# Patient Record
Sex: FEMALE | Race: WHITE | Employment: FULL TIME | URBAN - METROPOLITAN AREA
[De-identification: names, ages, dates, MRNs, and addresses within clinical notes are randomized per-mention and may not be internally consistent; named-entity substitution may affect disease eponyms.]

---

## 2017-09-07 ENCOUNTER — APPOINTMENT (OUTPATIENT)
Dept: LAB | Age: 32
End: 2017-09-07
Attending: FAMILY MEDICINE
Payer: COMMERCIAL

## 2017-09-07 ENCOUNTER — OFFICE VISIT (OUTPATIENT)
Dept: FAMILY MEDICINE CLINIC | Facility: CLINIC | Age: 32
End: 2017-09-07

## 2017-09-07 VITALS
WEIGHT: 170 LBS | RESPIRATION RATE: 16 BRPM | BODY MASS INDEX: 30.12 KG/M2 | HEIGHT: 63 IN | SYSTOLIC BLOOD PRESSURE: 104 MMHG | HEART RATE: 80 BPM | DIASTOLIC BLOOD PRESSURE: 62 MMHG | TEMPERATURE: 98 F | OXYGEN SATURATION: 99 %

## 2017-09-07 DIAGNOSIS — Z13.0 SCREENING FOR ENDOCRINE, NUTRITIONAL, METABOLIC AND IMMUNITY DISORDER: ICD-10-CM

## 2017-09-07 DIAGNOSIS — Z00.00 ANNUAL PHYSICAL EXAM: Primary | ICD-10-CM

## 2017-09-07 DIAGNOSIS — F41.9 ANXIETY AND DEPRESSION: ICD-10-CM

## 2017-09-07 DIAGNOSIS — Z13.29 SCREENING FOR ENDOCRINE, NUTRITIONAL, METABOLIC AND IMMUNITY DISORDER: ICD-10-CM

## 2017-09-07 DIAGNOSIS — Z13.228 SCREENING FOR ENDOCRINE, NUTRITIONAL, METABOLIC AND IMMUNITY DISORDER: ICD-10-CM

## 2017-09-07 DIAGNOSIS — Z13.21 SCREENING FOR ENDOCRINE, NUTRITIONAL, METABOLIC AND IMMUNITY DISORDER: ICD-10-CM

## 2017-09-07 DIAGNOSIS — F32.A ANXIETY AND DEPRESSION: ICD-10-CM

## 2017-09-07 LAB
25-HYDROXYVITAMIN D (TOTAL): 35.4 NG/ML (ref 30–100)
ALBUMIN SERPL-MCNC: 3.8 G/DL (ref 3.5–4.8)
ALP LIVER SERPL-CCNC: 40 U/L (ref 37–98)
ALT SERPL-CCNC: 19 U/L (ref 14–54)
AST SERPL-CCNC: 9 U/L (ref 15–41)
BILIRUB SERPL-MCNC: 0.4 MG/DL (ref 0.1–2)
BUN BLD-MCNC: 11 MG/DL (ref 8–20)
CALCIUM BLD-MCNC: 8.9 MG/DL (ref 8.3–10.3)
CHLORIDE: 109 MMOL/L (ref 101–111)
CHOLEST SMN-MCNC: 134 MG/DL (ref ?–200)
CO2: 26 MMOL/L (ref 22–32)
CREAT BLD-MCNC: 0.65 MG/DL (ref 0.55–1.02)
ERYTHROCYTE [DISTWIDTH] IN BLOOD BY AUTOMATED COUNT: 12.4 % (ref 11.5–16)
GLUCOSE BLD-MCNC: 74 MG/DL (ref 70–99)
HAV AB SERPL IA-ACNC: 703 PG/ML (ref 193–986)
HCT VFR BLD AUTO: 38.6 % (ref 34–50)
HDLC SERPL-MCNC: 71 MG/DL (ref 45–?)
HDLC SERPL: 1.89 {RATIO} (ref ?–4.44)
HGB BLD-MCNC: 12.6 G/DL (ref 12–16)
LDLC SERPL CALC-MCNC: 57 MG/DL (ref ?–130)
LDLC SERPL-MCNC: 6 MG/DL (ref 5–40)
M PROTEIN MFR SERPL ELPH: 7.1 G/DL (ref 6.1–8.3)
MCH RBC QN AUTO: 31.3 PG (ref 27–33.2)
MCHC RBC AUTO-ENTMCNC: 32.6 G/DL (ref 31–37)
MCV RBC AUTO: 95.8 FL (ref 81–100)
NONHDLC SERPL-MCNC: 63 MG/DL (ref ?–130)
PLATELET # BLD AUTO: 226 10(3)UL (ref 150–450)
POTASSIUM SERPL-SCNC: 4.2 MMOL/L (ref 3.6–5.1)
RBC # BLD AUTO: 4.03 X10(6)UL (ref 3.8–5.1)
RED CELL DISTRIBUTION WIDTH-SD: 43.5 FL (ref 35.1–46.3)
SODIUM SERPL-SCNC: 141 MMOL/L (ref 136–144)
TRIGLYCERIDES: 32 MG/DL (ref ?–150)
TSI SER-ACNC: 0.94 MIU/ML (ref 0.35–5.5)
WBC # BLD AUTO: 5.1 X10(3) UL (ref 4–13)

## 2017-09-07 PROCEDURE — 80050 GENERAL HEALTH PANEL: CPT | Performed by: FAMILY MEDICINE

## 2017-09-07 PROCEDURE — 82306 VITAMIN D 25 HYDROXY: CPT | Performed by: FAMILY MEDICINE

## 2017-09-07 PROCEDURE — 80061 LIPID PANEL: CPT | Performed by: FAMILY MEDICINE

## 2017-09-07 PROCEDURE — 36415 COLL VENOUS BLD VENIPUNCTURE: CPT | Performed by: FAMILY MEDICINE

## 2017-09-07 PROCEDURE — 82607 VITAMIN B-12: CPT | Performed by: FAMILY MEDICINE

## 2017-09-07 PROCEDURE — 99395 PREV VISIT EST AGE 18-39: CPT | Performed by: FAMILY MEDICINE

## 2017-09-07 RX ORDER — CLONAZEPAM 0.5 MG/1
0.5 TABLET ORAL 2 TIMES DAILY PRN
Qty: 30 TABLET | Refills: 0 | Status: SHIPPED
Start: 2017-09-07 | End: 2019-09-09

## 2017-09-07 NOTE — PROGRESS NOTES
Rock Quiroz is a 33year old female.     CC:  Patient presents with:  Physical: X 2 weeks feeling tired, stress is high, weight gain      HPI:  Annual Depression Screen due on 08/07/1986  Annual Physical due on 08/07/1988  Pap Smear,3 Years due on 08/0 Immunization History  Administered            Date(s) Administered    TDAP                  10/29/2010          Wt Readings from Last 6 Encounters:  09/07/17 : 170 lb  12/12/16 : 165 lb  12/01/16 : 165 lb  05/10/16 : 165 lb  10/07/14 : 160 lb  03 without murmur  GI: good BS's,no masses, HSM or tenderness  MUSCULOSKELETAL: back is not tender,FROM of the back  EXTREMITIES: no cyanosis, clubbing or edema  SKIN: no rashes,no suspicious lesions  NEURO: Oriented times three, cranial nerves are intact, mo

## 2017-09-12 ENCOUNTER — TELEPHONE (OUTPATIENT)
Dept: FAMILY MEDICINE CLINIC | Facility: CLINIC | Age: 32
End: 2017-09-12

## 2017-09-12 NOTE — TELEPHONE ENCOUNTER
----- Message from Chrissie Weems MD sent at 9/10/2017  8:06 AM CDT -----  Results reviewed. Tests show no significant abnormalities. Please inform patient.

## 2018-10-24 ENCOUNTER — OFFICE VISIT (OUTPATIENT)
Dept: FAMILY MEDICINE CLINIC | Facility: CLINIC | Age: 33
End: 2018-10-24
Payer: COMMERCIAL

## 2018-10-24 VITALS
SYSTOLIC BLOOD PRESSURE: 112 MMHG | RESPIRATION RATE: 16 BRPM | HEART RATE: 82 BPM | BODY MASS INDEX: 31.89 KG/M2 | TEMPERATURE: 99 F | HEIGHT: 63 IN | DIASTOLIC BLOOD PRESSURE: 74 MMHG | OXYGEN SATURATION: 98 % | WEIGHT: 180 LBS

## 2018-10-24 DIAGNOSIS — J20.9 ACUTE BRONCHITIS WITH SYMPTOMS > 10 DAYS: Primary | ICD-10-CM

## 2018-10-24 PROCEDURE — 99213 OFFICE O/P EST LOW 20 MIN: CPT | Performed by: NURSE PRACTITIONER

## 2018-10-24 RX ORDER — AZITHROMYCIN 250 MG/1
TABLET, FILM COATED ORAL
Qty: 6 TABLET | Refills: 0 | Status: SHIPPED | OUTPATIENT
Start: 2018-10-24 | End: 2019-07-02 | Stop reason: ALTCHOICE

## 2018-10-24 RX ORDER — BENZONATATE 200 MG/1
200 CAPSULE ORAL 3 TIMES DAILY PRN
Qty: 20 CAPSULE | Refills: 0 | Status: SHIPPED | OUTPATIENT
Start: 2018-10-24 | End: 2018-10-31

## 2018-10-24 NOTE — PROGRESS NOTES
CHIEF COMPLAINT:   Patient presents with:  Cough: x 2.5 weeks, coughing attacks       HPI:   Bhavna Ly is a 29year old female who presents for upper respiratory symptoms for  2.5 weeks.  Patient reports cough with white colored sputum, wheezing, denie GENERAL: well developed, well nourished, and in no apparent distress  SKIN: no rashes, no suspicious lesions  HEAD: atraumatic, normocephalic.  no tenderness on palpation of maxillary or frontal sinuses.   EYES: conjunctiva clear, EOM intact  EARS: TM's jules Symptoms of bronchitis include cough with mucus (phlegm) and low-grade fever. Bronchitis usually lasts 7 to 14 days. Mild cases can be treated with simple home remedies. More severe infection is treated with an antibiotic.   Home care  Follow these guidelin If you are age 72 or older, or if you have a chronic lung disease or condition that affects your immune system, or you smoke, ask your healthcare provider about getting a pneumococcal vaccine and a yearly flu shot (influenza vaccine).   When to seek medical

## 2019-04-10 PROCEDURE — 87624 HPV HI-RISK TYP POOLED RSLT: CPT | Performed by: NURSE PRACTITIONER

## 2019-04-10 PROCEDURE — 87591 N.GONORRHOEAE DNA AMP PROB: CPT | Performed by: NURSE PRACTITIONER

## 2019-04-10 PROCEDURE — 87491 CHLMYD TRACH DNA AMP PROBE: CPT | Performed by: NURSE PRACTITIONER

## 2019-04-10 PROCEDURE — 88175 CYTOPATH C/V AUTO FLUID REDO: CPT | Performed by: NURSE PRACTITIONER

## 2019-07-02 ENCOUNTER — OFFICE VISIT (OUTPATIENT)
Dept: FAMILY MEDICINE CLINIC | Facility: CLINIC | Age: 34
End: 2019-07-02
Payer: COMMERCIAL

## 2019-07-02 VITALS
HEIGHT: 63 IN | TEMPERATURE: 98 F | WEIGHT: 165 LBS | BODY MASS INDEX: 29.23 KG/M2 | RESPIRATION RATE: 16 BRPM | HEART RATE: 79 BPM | SYSTOLIC BLOOD PRESSURE: 92 MMHG | OXYGEN SATURATION: 98 % | DIASTOLIC BLOOD PRESSURE: 52 MMHG

## 2019-07-02 DIAGNOSIS — L03.116 CELLULITIS OF LEFT LOWER EXTREMITY: Primary | ICD-10-CM

## 2019-07-02 PROCEDURE — 99213 OFFICE O/P EST LOW 20 MIN: CPT | Performed by: NURSE PRACTITIONER

## 2019-07-02 RX ORDER — DOXYCYCLINE HYCLATE 100 MG/1
100 CAPSULE ORAL 2 TIMES DAILY
Qty: 14 CAPSULE | Refills: 0 | Status: SHIPPED | OUTPATIENT
Start: 2019-07-02 | End: 2019-07-09

## 2019-07-02 RX ORDER — METHYLPREDNISOLONE 4 MG/1
TABLET ORAL
Qty: 1 KIT | Refills: 0 | Status: SHIPPED | OUTPATIENT
Start: 2019-07-02 | End: 2019-09-09 | Stop reason: ALTCHOICE

## 2019-07-02 NOTE — PATIENT INSTRUCTIONS
Cellulitis  Cellulitis is an infection of the deep layers of skin. A break in the skin, such as a cut or scratch, can let bacteria under the skin. If the bacteria get to deep layers of the skin, it can be serious.  If not treated, cellulitis can get into · Fever higher of 100.4º F (38.0º C) or higher after 2 days on antibiotics  Date Last Reviewed: 9/1/2016  © 6486-6667 [employer]. [address] [facility], [address] [place] [address]. All rights reserved.  This information is not intended as a substitut

## 2019-07-02 NOTE — PROGRESS NOTES
HPI:    Patient ID: Pito Canales is a 29year old female. Insect Bite   This is a new problem. The current episode started yesterday. The problem has been gradually worsening since onset. Location: left outer thigh/hip.  The rash is characterized by re Cellulitis of left lower extremity  (primary encounter diagnosis)    No orders of the defined types were placed in this encounter.       Meds This Visit:  Requested Prescriptions     Signed Prescriptions Disp Refills   • Doxycycline Hyclate 100 MG Oral Cap · Take all of the antibiotic medicine exactly as directed until it is gone. Do not miss any doses, especially during the first 7 days. Don’t stop taking the medicine when your symptoms get better. · Keep the affected area clean and dry.   · Wash your hands

## 2019-09-09 ENCOUNTER — LAB ENCOUNTER (OUTPATIENT)
Dept: LAB | Age: 34
End: 2019-09-09
Attending: FAMILY MEDICINE
Payer: COMMERCIAL

## 2019-09-09 ENCOUNTER — OFFICE VISIT (OUTPATIENT)
Dept: FAMILY MEDICINE CLINIC | Facility: CLINIC | Age: 34
End: 2019-09-09
Payer: COMMERCIAL

## 2019-09-09 VITALS
SYSTOLIC BLOOD PRESSURE: 110 MMHG | HEART RATE: 80 BPM | TEMPERATURE: 98 F | RESPIRATION RATE: 16 BRPM | OXYGEN SATURATION: 98 % | BODY MASS INDEX: 28.7 KG/M2 | DIASTOLIC BLOOD PRESSURE: 64 MMHG | WEIGHT: 162 LBS | HEIGHT: 63 IN

## 2019-09-09 DIAGNOSIS — R21 TARGET RASH: ICD-10-CM

## 2019-09-09 DIAGNOSIS — F33.8 SEASONAL AFFECTIVE DISORDER (HCC): ICD-10-CM

## 2019-09-09 DIAGNOSIS — Z00.00 ROUTINE PHYSICAL EXAMINATION: Primary | ICD-10-CM

## 2019-09-09 DIAGNOSIS — F32.81 PMDD (PREMENSTRUAL DYSPHORIC DISORDER): ICD-10-CM

## 2019-09-09 DIAGNOSIS — Z01.419 WOMEN'S ANNUAL ROUTINE GYNECOLOGICAL EXAMINATION: ICD-10-CM

## 2019-09-09 DIAGNOSIS — N92.6 IRREGULAR MENSES: ICD-10-CM

## 2019-09-09 DIAGNOSIS — F41.8 OTHER SPECIFIED ANXIETY DISORDERS: ICD-10-CM

## 2019-09-09 DIAGNOSIS — Z00.00 LABORATORY EXAMINATION ORDERED AS PART OF A ROUTINE GENERAL MEDICAL EXAMINATION: ICD-10-CM

## 2019-09-09 DIAGNOSIS — G43.109 MIGRAINE WITH AURA AND WITHOUT STATUS MIGRAINOSUS, NOT INTRACTABLE: ICD-10-CM

## 2019-09-09 LAB
ALBUMIN SERPL-MCNC: 3.9 G/DL (ref 3.4–5)
ALBUMIN/GLOB SERPL: 1.1 {RATIO} (ref 1–2)
ALP LIVER SERPL-CCNC: 47 U/L (ref 37–98)
ALT SERPL-CCNC: 15 U/L (ref 13–56)
ANION GAP SERPL CALC-SCNC: 5 MMOL/L (ref 0–18)
AST SERPL-CCNC: 14 U/L (ref 15–37)
BASOPHILS # BLD AUTO: 0.02 X10(3) UL (ref 0–0.2)
BASOPHILS NFR BLD AUTO: 0.3 %
BILIRUB SERPL-MCNC: 0.6 MG/DL (ref 0.1–2)
BUN BLD-MCNC: 10 MG/DL (ref 7–18)
BUN/CREAT SERPL: 13.7 (ref 10–20)
CALCIUM BLD-MCNC: 9.2 MG/DL (ref 8.5–10.1)
CHLORIDE SERPL-SCNC: 106 MMOL/L (ref 98–112)
CHOLEST SMN-MCNC: 153 MG/DL (ref ?–200)
CO2 SERPL-SCNC: 29 MMOL/L (ref 21–32)
CREAT BLD-MCNC: 0.73 MG/DL (ref 0.55–1.02)
DEPRECATED HBV CORE AB SER IA-ACNC: 31.6 NG/ML (ref 12–160)
DEPRECATED RDW RBC AUTO: 43.3 FL (ref 35.1–46.3)
EOSINOPHIL # BLD AUTO: 0.05 X10(3) UL (ref 0–0.7)
EOSINOPHIL NFR BLD AUTO: 0.7 %
ERYTHROCYTE [DISTWIDTH] IN BLOOD BY AUTOMATED COUNT: 12.3 % (ref 11–15)
GLOBULIN PLAS-MCNC: 3.5 G/DL (ref 2.8–4.4)
GLUCOSE BLD-MCNC: 87 MG/DL (ref 70–99)
HCT VFR BLD AUTO: 39.4 % (ref 35–48)
HDLC SERPL-MCNC: 86 MG/DL (ref 40–59)
HGB BLD-MCNC: 12.9 G/DL (ref 12–16)
IMM GRANULOCYTES # BLD AUTO: 0.02 X10(3) UL (ref 0–1)
IMM GRANULOCYTES NFR BLD: 0.3 %
IRON SATURATION: 33 % (ref 15–50)
IRON SERPL-MCNC: 146 UG/DL (ref 50–170)
LDLC SERPL CALC-MCNC: 62 MG/DL (ref ?–100)
LYMPHOCYTES # BLD AUTO: 1.5 X10(3) UL (ref 1–4)
LYMPHOCYTES NFR BLD AUTO: 21.3 %
M PROTEIN MFR SERPL ELPH: 7.4 G/DL (ref 6.4–8.2)
MCH RBC QN AUTO: 31.5 PG (ref 26–34)
MCHC RBC AUTO-ENTMCNC: 32.7 G/DL (ref 31–37)
MCV RBC AUTO: 96.1 FL (ref 80–100)
MONOCYTES # BLD AUTO: 0.53 X10(3) UL (ref 0.1–1)
MONOCYTES NFR BLD AUTO: 7.5 %
NEUTROPHILS # BLD AUTO: 4.91 X10 (3) UL (ref 1.5–7.7)
NEUTROPHILS # BLD AUTO: 4.91 X10(3) UL (ref 1.5–7.7)
NEUTROPHILS NFR BLD AUTO: 69.9 %
NONHDLC SERPL-MCNC: 67 MG/DL (ref ?–130)
OSMOLALITY SERPL CALC.SUM OF ELEC: 288 MOSM/KG (ref 275–295)
PLATELET # BLD AUTO: 264 10(3)UL (ref 150–450)
POTASSIUM SERPL-SCNC: 3.9 MMOL/L (ref 3.5–5.1)
RBC # BLD AUTO: 4.1 X10(6)UL (ref 3.8–5.3)
SODIUM SERPL-SCNC: 140 MMOL/L (ref 136–145)
TOTAL IRON BINDING CAPACITY: 441 UG/DL (ref 240–450)
TRANSFERRIN SERPL-MCNC: 296 MG/DL (ref 200–360)
TRIGL SERPL-MCNC: 27 MG/DL (ref 30–149)
TSI SER-ACNC: 0.69 MIU/ML (ref 0.36–3.74)
VIT D+METAB SERPL-MCNC: 44.8 NG/ML (ref 30–100)
VLDLC SERPL CALC-MCNC: 5 MG/DL (ref 0–30)
WBC # BLD AUTO: 7 X10(3) UL (ref 4–11)

## 2019-09-09 PROCEDURE — 80053 COMPREHEN METABOLIC PANEL: CPT | Performed by: FAMILY MEDICINE

## 2019-09-09 PROCEDURE — 99395 PREV VISIT EST AGE 18-39: CPT | Performed by: FAMILY MEDICINE

## 2019-09-09 PROCEDURE — 90715 TDAP VACCINE 7 YRS/> IM: CPT | Performed by: FAMILY MEDICINE

## 2019-09-09 PROCEDURE — 82728 ASSAY OF FERRITIN: CPT | Performed by: FAMILY MEDICINE

## 2019-09-09 PROCEDURE — 90471 IMMUNIZATION ADMIN: CPT | Performed by: FAMILY MEDICINE

## 2019-09-09 PROCEDURE — 83540 ASSAY OF IRON: CPT | Performed by: FAMILY MEDICINE

## 2019-09-09 PROCEDURE — 36415 COLL VENOUS BLD VENIPUNCTURE: CPT | Performed by: FAMILY MEDICINE

## 2019-09-09 PROCEDURE — 86618 LYME DISEASE ANTIBODY: CPT | Performed by: FAMILY MEDICINE

## 2019-09-09 PROCEDURE — 83550 IRON BINDING TEST: CPT | Performed by: FAMILY MEDICINE

## 2019-09-09 PROCEDURE — 82306 VITAMIN D 25 HYDROXY: CPT | Performed by: FAMILY MEDICINE

## 2019-09-09 RX ORDER — CLONAZEPAM 0.5 MG/1
0.5 TABLET ORAL 2 TIMES DAILY PRN
Qty: 30 TABLET | Refills: 0 | Status: SHIPPED | OUTPATIENT
Start: 2019-09-09 | End: 2020-03-11

## 2019-09-09 RX ORDER — SUMATRIPTAN 50 MG/1
TABLET, FILM COATED ORAL
Qty: 12 TABLET | Refills: 2 | Status: SHIPPED | OUTPATIENT
Start: 2019-09-09 | End: 2019-10-15 | Stop reason: ALTCHOICE

## 2019-09-09 NOTE — PROGRESS NOTES
Ozzy Friedman is a 36year old female. CC:  Patient presents with:   Well Adult: annual visit, no pap      HPI:  Annual Depression Screen due on 08/07/1998  Influenza Vaccine(1) due on 09/01/2019  Annual Physical due on 04/10/2020  Pap Smear,3 Years d Problem Relation Age of Onset   • Breast Cancer Paternal Grandmother 48   • Cancer Paternal Grandmother    • Heart Disease Paternal Grandmother    • Other (Other) Paternal Grandmother    • Other (HTN) Paternal Grandmother    • Breast Cancer Maternal [facility] [place] constipation, diarrhea  : no dysuria, urgency or frequency, no vaginal discharge  MUSCULOSKELETAL: no joint complaints upper or lower extremities  NEURO: +migraines as above, no N/T  HEMATOLOGY: denies bruising or excessive bleeding  ENDOCRINE: denies ex DISEASE, TOTAL AB W RFLX; Future    6. Other specified anxiety disorders  Clonazepam prn     7. Seasonal affective disorder ([facility]  - VITAMIN D, 25-HYDROXY; Future    8.  Migraine with aura and without status migrainosus, not intractable  imitrex prn     Rec

## 2019-09-09 NOTE — PATIENT INSTRUCTIONS
Monitor for migraine symptoms, follow-up with your OB doctor if you continue to get these migraines with a rainbow or unusual image. Estrogen can increase the risk of migraines in some women and this could also be a sign of an increased risk of stroke. can help prevent migraines and improve your health. (If exercise triggers your migraines, talk to your healthcare provider.)  · Keep regular habits. Don’t skip or delay meals. Drink plenty of water. And go to bed and get up at about the same time each day.

## 2019-09-11 LAB — B BURGDOR IGG+IGM SER QL: NEGATIVE

## 2019-10-15 ENCOUNTER — TELEPHONE (OUTPATIENT)
Dept: FAMILY MEDICINE CLINIC | Facility: CLINIC | Age: 34
End: 2019-10-15

## 2019-10-15 RX ORDER — RIZATRIPTAN BENZOATE 10 MG/1
10 TABLET ORAL AS NEEDED
Qty: 10 TABLET | Refills: 2 | Status: SHIPPED | OUTPATIENT
Start: 2019-10-15 | End: 2020-05-12

## 2019-10-15 NOTE — TELEPHONE ENCOUNTER
Patient was prescribed Immitrex but it is not working for her and was hoping that she could be switched to [facility].

## 2019-10-15 NOTE — TELEPHONE ENCOUNTER
LOV 9/9/19  I called to confirm pt is requesting Maxalt. Her mom has Maxalt and she has tried that before and it helped. She currently has a migraine for about 1 1/2 hrs and took an Imitrex with little relief.   Pain is generalized all over the head and

## 2020-03-11 RX ORDER — CLONAZEPAM 0.5 MG/1
TABLET ORAL
Qty: 30 TABLET | Refills: 0 | Status: SHIPPED | OUTPATIENT
Start: 2020-03-11 | End: 2020-04-13

## 2020-03-11 NOTE — TELEPHONE ENCOUNTER
Medication(s) to Refill:   Requested Prescriptions     Pending Prescriptions Disp Refills   • CLONAZEPAM 0.5 MG Oral Tab [Pharmacy Med Name: Clonazepam 0.5 Mg Tab Acco] 30 tablet 0     Sig: TAKE ONE TABLET BY MOUTH TWICE DAILY AS NEEDED FOR ANXIETY

## 2020-04-13 RX ORDER — CLONAZEPAM 0.5 MG/1
TABLET ORAL
Qty: 30 TABLET | Refills: 0 | Status: SHIPPED | OUTPATIENT
Start: 2020-04-13 | End: 2020-05-21

## 2020-04-13 NOTE — TELEPHONE ENCOUNTER
Medication(s) to Refill:   Requested Prescriptions     Pending Prescriptions Disp Refills   • CLONAZEPAM 0.5 MG Oral Tab [Pharmacy Med Name: Clonazepam 0.5 Mg Tab Teva] 30 tablet 0     Sig: TAKE ONE TABLET BY MOUTH TWICE DAILY AS NEEDED FOR ANXIETY

## 2020-04-22 NOTE — LETTER
Date: 1/10/2022    Patient Name: Yasmine Baker          To Whom it may concern: This letter has been written at the patient's request. The above patient was seen at the [facility] for treatment of a medical condition.     This patient shou
Patient has no objection to blood transfusions.

## 2020-05-08 RX ORDER — RIZATRIPTAN BENZOATE 10 MG/1
TABLET ORAL
Qty: 10 TABLET | Refills: 0 | OUTPATIENT
Start: 2020-05-08

## 2020-05-08 NOTE — TELEPHONE ENCOUNTER
ZZV:7/9/6152  LF:10/15/2019    LVM for patient to call the office. Patient needs medication follow up.

## 2020-05-12 RX ORDER — RIZATRIPTAN BENZOATE 10 MG/1
10 TABLET ORAL AS NEEDED
Qty: 10 TABLET | Refills: 2 | Status: SHIPPED | OUTPATIENT
Start: 2020-05-12 | End: 2020-09-09

## 2020-05-12 NOTE — TELEPHONE ENCOUNTER
LOV: 9/9/19  LF: 10/15/19  Patient scheduled an appointment for 5/14/20 but is needing a refill before then  Please approve or deny pending Rx. Thank you!

## 2020-05-14 ENCOUNTER — TELEMEDICINE (OUTPATIENT)
Dept: FAMILY MEDICINE CLINIC | Facility: CLINIC | Age: 35
End: 2020-05-14
Payer: COMMERCIAL

## 2020-05-14 DIAGNOSIS — F41.9 ANXIETY AND DEPRESSION: ICD-10-CM

## 2020-05-14 DIAGNOSIS — G43.109 MIGRAINE WITH AURA AND WITHOUT STATUS MIGRAINOSUS, NOT INTRACTABLE: Primary | ICD-10-CM

## 2020-05-14 DIAGNOSIS — F32.A ANXIETY AND DEPRESSION: ICD-10-CM

## 2020-05-14 PROCEDURE — 99213 OFFICE O/P EST LOW 20 MIN: CPT | Performed by: FAMILY MEDICINE

## 2020-05-14 NOTE — PROGRESS NOTES
This is a telemedicine visit with live, interactive video and audio. Virtual visit conducted in lieu of face to face encounter due to Covid-19 pandemic.     Patient understands and accepts financial responsibility for any deductible, co-insurance and/or date: 2013        Years since quittin.2      Smokeless tobacco: Never Used      Tobacco comment: over 1 pk. daily    Alcohol use:  Yes      Alcohol/week: 1.0 standard drinks      Types: 1 Glasses of wine per week      Frequency: Monthly or less

## 2020-05-20 NOTE — TELEPHONE ENCOUNTER
Medication(s) to Refill:   Requested Prescriptions     Pending Prescriptions Disp Refills   • CLONAZEPAM 0.5 MG Oral Tab [Pharmacy Med Name: Clonazepam 0.5 Mg Tab Teva] 30 tablet 0     Sig: TAKE ONE TABLET BY MOUTH TWICE A DAY AS NEEDED FOR ANXIETY

## 2020-05-21 RX ORDER — CLONAZEPAM 0.5 MG/1
TABLET ORAL
Qty: 30 TABLET | Refills: 0 | Status: SHIPPED | OUTPATIENT
Start: 2020-05-21 | End: 2020-06-15

## 2020-06-13 RX ORDER — CLONAZEPAM 0.5 MG/1
TABLET ORAL
Qty: 30 TABLET | Refills: 0 | OUTPATIENT
Start: 2020-06-13

## 2020-06-15 RX ORDER — CLONAZEPAM 0.5 MG/1
TABLET ORAL
Qty: 30 TABLET | Refills: 2 | Status: SHIPPED | OUTPATIENT
Start: 2020-06-15 | End: 2020-09-09

## 2020-06-15 NOTE — TELEPHONE ENCOUNTER
Medication(s) to Refill:   Requested Prescriptions     Pending Prescriptions Disp Refills   • CLONAZEPAM 0.5 MG Oral Tab [Pharmacy Med Name: Clonazepam 0.5 Mg Tab Acco] 30 tablet 0     Sig: TAKE ONE TABLET BY MOUTH TWICE A DAY AS NEEDED FOR ANXIETY

## 2020-09-09 RX ORDER — RIZATRIPTAN BENZOATE 10 MG/1
TABLET ORAL
Qty: 10 TABLET | Refills: 0 | Status: SHIPPED | OUTPATIENT
Start: 2020-09-09 | End: 2020-10-22

## 2020-09-09 RX ORDER — CLONAZEPAM 0.5 MG/1
TABLET ORAL
Qty: 30 TABLET | Refills: 0 | Status: SHIPPED | OUTPATIENT
Start: 2020-09-09 | End: 2021-02-01

## 2020-10-22 RX ORDER — RIZATRIPTAN BENZOATE 10 MG/1
10 TABLET ORAL AS NEEDED
Qty: 10 TABLET | Refills: 2 | Status: SHIPPED | OUTPATIENT
Start: 2020-10-22 | End: 2021-02-23

## 2021-02-01 RX ORDER — CLONAZEPAM 0.5 MG/1
0.5 TABLET ORAL 2 TIMES DAILY PRN
Qty: 10 TABLET | Refills: 0 | Status: SHIPPED | OUTPATIENT
Start: 2021-02-01 | End: 2021-08-10

## 2021-02-22 NOTE — TELEPHONE ENCOUNTER
.  Medication(s) to Refill:   Requested Prescriptions     Pending Prescriptions Disp Refills   • RIZATRIPTAN BENZOATE 10 MG Oral Tab [Pharmacy Med Name: Rizatriptan Benzoate 10 Mg Tab Auro] 10 tablet 0     Sig: TAKE ONE TABLET BY MOUTH AS NEEDED for migrai

## 2021-02-23 RX ORDER — RIZATRIPTAN BENZOATE 10 MG/1
10 TABLET ORAL AS NEEDED
Qty: 10 TABLET | Refills: 0 | Status: SHIPPED | OUTPATIENT
Start: 2021-02-23 | End: 2021-08-10

## 2021-08-04 ENCOUNTER — TELEPHONE (OUTPATIENT)
Dept: FAMILY MEDICINE CLINIC | Facility: CLINIC | Age: 36
End: 2021-08-04

## 2021-08-04 NOTE — TELEPHONE ENCOUNTER
Pt has appt for physical on Tuesday. Pt states she's having bad back pain. Sometimes its hard to breath while experiencing  The pain. It is making her difficult to work. Pt requesting to speak to a nurse.

## 2021-08-06 NOTE — TELEPHONE ENCOUNTER
Spoke to pt and she is coming in Tuesday for physical    She states back pain pretty bad started couple months ago, started with tingling in back and then started to hurt more frequently after that but now past couple weeks has consistent pain to upper joann

## 2021-08-10 ENCOUNTER — OFFICE VISIT (OUTPATIENT)
Dept: FAMILY MEDICINE CLINIC | Facility: CLINIC | Age: 36
End: 2021-08-10
Payer: COMMERCIAL

## 2021-08-10 ENCOUNTER — HOSPITAL ENCOUNTER (OUTPATIENT)
Dept: GENERAL RADIOLOGY | Age: 36
Discharge: HOME | End: 2021-08-10
Attending: NURSE PRACTITIONER
Payer: COMMERCIAL

## 2021-08-10 ENCOUNTER — LAB ENCOUNTER (OUTPATIENT)
Dept: LAB | Age: 36
End: 2021-08-10
Attending: NURSE PRACTITIONER
Payer: COMMERCIAL

## 2021-08-10 VITALS
DIASTOLIC BLOOD PRESSURE: 70 MMHG | WEIGHT: 156 LBS | HEART RATE: 76 BPM | HEIGHT: 63 IN | OXYGEN SATURATION: 99 % | RESPIRATION RATE: 16 BRPM | SYSTOLIC BLOOD PRESSURE: 110 MMHG | BODY MASS INDEX: 27.64 KG/M2

## 2021-08-10 DIAGNOSIS — G43.109 MIGRAINE WITH AURA AND WITHOUT STATUS MIGRAINOSUS, NOT INTRACTABLE: ICD-10-CM

## 2021-08-10 DIAGNOSIS — M54.6 DORSALGIA OF THORACIC REGION: ICD-10-CM

## 2021-08-10 DIAGNOSIS — Z00.00 ROUTINE GENERAL MEDICAL EXAMINATION AT A HEALTH CARE FACILITY: Primary | ICD-10-CM

## 2021-08-10 DIAGNOSIS — Z00.00 LABORATORY EXAM ORDERED AS PART OF ROUTINE GENERAL MEDICAL EXAMINATION: ICD-10-CM

## 2021-08-10 DIAGNOSIS — F32.A ANXIETY AND DEPRESSION: ICD-10-CM

## 2021-08-10 DIAGNOSIS — F41.9 ANXIETY AND DEPRESSION: ICD-10-CM

## 2021-08-10 DIAGNOSIS — R00.2 PALPITATIONS: ICD-10-CM

## 2021-08-10 LAB
ALBUMIN SERPL-MCNC: 3.7 G/DL (ref 3.4–5)
ALBUMIN/GLOB SERPL: 1.2 {RATIO} (ref 1–2)
ALP LIVER SERPL-CCNC: 37 U/L
ALT SERPL-CCNC: 19 U/L
ANION GAP SERPL CALC-SCNC: 3 MMOL/L (ref 0–18)
AST SERPL-CCNC: 10 U/L (ref 15–37)
BASOPHILS # BLD AUTO: 0.02 X10(3) UL (ref 0–0.2)
BASOPHILS NFR BLD AUTO: 0.3 %
BILIRUB SERPL-MCNC: 0.4 MG/DL (ref 0.1–2)
BUN BLD-MCNC: 10 MG/DL (ref 7–18)
CALCIUM BLD-MCNC: 8.2 MG/DL (ref 8.5–10.1)
CHLORIDE SERPL-SCNC: 109 MMOL/L (ref 98–112)
CHOLEST SMN-MCNC: 152 MG/DL (ref ?–200)
CO2 SERPL-SCNC: 27 MMOL/L (ref 21–32)
CREAT BLD-MCNC: 0.76 MG/DL
D-DIMER: <0.27 UG/ML FEU (ref ?–0.5)
EOSINOPHIL # BLD AUTO: 0.09 X10(3) UL (ref 0–0.7)
EOSINOPHIL NFR BLD AUTO: 1.6 %
ERYTHROCYTE [DISTWIDTH] IN BLOOD BY AUTOMATED COUNT: 12.5 %
GLOBULIN PLAS-MCNC: 3 G/DL (ref 2.8–4.4)
GLUCOSE BLD-MCNC: 89 MG/DL (ref 70–99)
HCT VFR BLD AUTO: 37 %
HDLC SERPL-MCNC: 74 MG/DL (ref 40–59)
HGB BLD-MCNC: 12.3 G/DL
IMM GRANULOCYTES # BLD AUTO: 0.01 X10(3) UL (ref 0–1)
IMM GRANULOCYTES NFR BLD: 0.2 %
LDLC SERPL CALC-MCNC: 70 MG/DL (ref ?–100)
LYMPHOCYTES # BLD AUTO: 1.53 X10(3) UL (ref 1–4)
LYMPHOCYTES NFR BLD AUTO: 26.7 %
M PROTEIN MFR SERPL ELPH: 6.7 G/DL (ref 6.4–8.2)
MCH RBC QN AUTO: 32.2 PG (ref 26–34)
MCHC RBC AUTO-ENTMCNC: 33.2 G/DL (ref 31–37)
MCV RBC AUTO: 96.9 FL
MONOCYTES # BLD AUTO: 0.48 X10(3) UL (ref 0.1–1)
MONOCYTES NFR BLD AUTO: 8.4 %
NEUTROPHILS # BLD AUTO: 3.6 X10 (3) UL (ref 1.5–7.7)
NEUTROPHILS # BLD AUTO: 3.6 X10(3) UL (ref 1.5–7.7)
NEUTROPHILS NFR BLD AUTO: 62.8 %
NONHDLC SERPL-MCNC: 78 MG/DL (ref ?–130)
OSMOLALITY SERPL CALC.SUM OF ELEC: 287 MOSM/KG (ref 275–295)
PATIENT FASTING Y/N/NP: YES
PATIENT FASTING Y/N/NP: YES
PLATELET # BLD AUTO: 210 10(3)UL (ref 150–450)
POTASSIUM SERPL-SCNC: 3.9 MMOL/L (ref 3.5–5.1)
RBC # BLD AUTO: 3.82 X10(6)UL
SODIUM SERPL-SCNC: 139 MMOL/L (ref 136–145)
TRIGL SERPL-MCNC: 34 MG/DL (ref 30–149)
TSI SER-ACNC: 1.76 MIU/ML (ref 0.36–3.74)
VIT D+METAB SERPL-MCNC: 32.5 NG/ML (ref 30–100)
VLDLC SERPL CALC-MCNC: 5 MG/DL (ref 0–30)
WBC # BLD AUTO: 5.7 X10(3) UL (ref 4–11)

## 2021-08-10 PROCEDURE — 84443 ASSAY THYROID STIM HORMONE: CPT

## 2021-08-10 PROCEDURE — 85379 FIBRIN DEGRADATION QUANT: CPT

## 2021-08-10 PROCEDURE — 80053 COMPREHEN METABOLIC PANEL: CPT

## 2021-08-10 PROCEDURE — 80061 LIPID PANEL: CPT

## 2021-08-10 PROCEDURE — 85025 COMPLETE CBC W/AUTO DIFF WBC: CPT

## 2021-08-10 PROCEDURE — 71046 X-RAY EXAM CHEST 2 VIEWS: CPT | Performed by: NURSE PRACTITIONER

## 2021-08-10 PROCEDURE — 99395 PREV VISIT EST AGE 18-39: CPT | Performed by: NURSE PRACTITIONER

## 2021-08-10 PROCEDURE — 82306 VITAMIN D 25 HYDROXY: CPT

## 2021-08-10 PROCEDURE — 72072 X-RAY EXAM THORAC SPINE 3VWS: CPT | Performed by: NURSE PRACTITIONER

## 2021-08-10 PROCEDURE — 3008F BODY MASS INDEX DOCD: CPT | Performed by: NURSE PRACTITIONER

## 2021-08-10 PROCEDURE — 3074F SYST BP LT 130 MM HG: CPT | Performed by: NURSE PRACTITIONER

## 2021-08-10 PROCEDURE — 3078F DIAST BP <80 MM HG: CPT | Performed by: NURSE PRACTITIONER

## 2021-08-10 PROCEDURE — 36415 COLL VENOUS BLD VENIPUNCTURE: CPT

## 2021-08-10 RX ORDER — METHYLPREDNISOLONE 4 MG/1
TABLET ORAL
Qty: 21 EACH | Refills: 0 | Status: SHIPPED | OUTPATIENT
Start: 2021-08-10 | End: 2021-09-28 | Stop reason: ALTCHOICE

## 2021-08-10 RX ORDER — CYCLOBENZAPRINE HCL 10 MG
5 TABLET ORAL NIGHTLY
Qty: 30 TABLET | Refills: 0 | Status: SHIPPED | OUTPATIENT
Start: 2021-08-10 | End: 2021-10-11

## 2021-08-10 RX ORDER — CLONAZEPAM 0.5 MG/1
0.5 TABLET ORAL 2 TIMES DAILY PRN
Qty: 10 TABLET | Refills: 0 | Status: SHIPPED | OUTPATIENT
Start: 2021-08-10 | End: 2021-09-20

## 2021-08-10 RX ORDER — RIZATRIPTAN BENZOATE 10 MG/1
10 TABLET ORAL AS NEEDED
Qty: 10 TABLET | Refills: 0 | Status: SHIPPED | OUTPATIENT
Start: 2021-08-10 | End: 2021-11-05

## 2021-08-10 NOTE — PATIENT INSTRUCTIONS
Anxiety Reaction  Anxiety is the feeling we all get when we think something bad might happen. It is a normal response to stress and normally causes only a mild reaction. When anxiety becomes more severe, it can interfere with daily life.  In some cases, y your anxiety. These include simple things such as exercise, good nutrition, and adequate rest. Also, there are certain techniques that are helpful:  ? Relaxation  ? Breathing exercises  ? Visualization  ? Biofeedback  ?  Meditation  For more information abo good overall exercises to improve your fitness level. · Practice safe lifting methods (see below). · Practice good posture when sitting, standing, and walking. Don't sit for a long time. This puts more stress on the lower back than standing or walking. · Slowly raise your left knee to your chest as you flatten your lower back against the floor. Hold for 5 seconds. · Relax and repeat the exercise with your right knee. · Do 10 of these exercises for each leg.   Safe lifting method  · Don’t bend over at th consciousness  · Rapid or very slow heart rate  · Loss of  bowel or bladder control  When to seek medical advice  Call your healthcare provider right away if any of the following occur:  · Pain becomes worse or spreads to your arms or legs  · Weakness or n

## 2021-08-10 NOTE — PROGRESS NOTES
Hany Garcia is a 29year old female who presents for a complete physical exam, no gyn. HPI:     Patient presents with:  Wellness Visit      Patient feels well, dental visit up to date, no hearing problem. Vaccinations up to date.   Upper back pain an (Depression) Brother    • Other (proteus syndrome) Brother       Social History    Tobacco Use      Smoking status: Former Smoker        Packs/day: 1.00        Years: 18.00        Pack years: 18        Types: Cigarettes        Start date: 1997        Quit tenderness, no hernias. Neuro: Cranial nerves II-XII normal,no focal abnormalities, and reflexes coordination and gait normal and symmetric. Sensation intact. Extremities: are symmetric with no cyanosis, clubbing, or edema.   MS: Normal muscles tones, no j indicates understanding of these issues and agrees to the plan. The patient is asked to return for CPX in 1 year.

## 2021-08-11 ENCOUNTER — TELEPHONE (OUTPATIENT)
Dept: FAMILY MEDICINE CLINIC | Facility: CLINIC | Age: 36
End: 2021-08-11

## 2021-08-11 DIAGNOSIS — M54.6 DORSALGIA OF THORACIC REGION: Primary | ICD-10-CM

## 2021-08-11 NOTE — TELEPHONE ENCOUNTER
----- Message from Ettie Siemens, APRN sent at 8/11/2021  7:25 AM CDT -----  Negative , trace for disc narrowing - physical therapy , muscle relaxant and antiinflammatory will recheck it in 4 weeks

## 2021-08-19 ENCOUNTER — TELEPHONE (OUTPATIENT)
Dept: PHYSICAL THERAPY | Facility: HOSPITAL | Age: 36
End: 2021-08-19

## 2021-08-25 ENCOUNTER — OFFICE VISIT (OUTPATIENT)
Dept: PHYSICAL THERAPY | Age: 36
End: 2021-08-25
Attending: NURSE PRACTITIONER
Payer: COMMERCIAL

## 2021-08-25 DIAGNOSIS — M54.6 DORSALGIA OF THORACIC REGION: ICD-10-CM

## 2021-08-25 PROCEDURE — 97162 PT EVAL MOD COMPLEX 30 MIN: CPT

## 2021-08-25 PROCEDURE — 97110 THERAPEUTIC EXERCISES: CPT

## 2021-08-25 NOTE — PROGRESS NOTES
INITIAL EVALUATION:   Referring Physician: Dr. Viviana Forrest  Diagnosis: Dorsalgia of thoracic region (M54.6)        Date of Service: 8/25/2021     PATIENT Surekha Richards is a 29year old y/o female who presents to therapy today with compla significant findings  Dona would benefit from skilled Physical Therapy to address the above impairments to decrease complaints of pain and facilitate return to previous level of function    Precautions:  None  OBJECTIVE:   Observation/Posture:  The patient Duration: Patient will be seen for 2 x/week or a total of 8 visits over a 90 day period. Treatment will include: Manual Therapy; Therapeutic Exercises; Neuromuscular Re-education; Therapeutic Activity;  Electrical Stim; Mechanical Traction;    Education or

## 2021-08-26 ENCOUNTER — TELEPHONE (OUTPATIENT)
Dept: FAMILY MEDICINE CLINIC | Facility: CLINIC | Age: 36
End: 2021-08-26

## 2021-08-26 NOTE — TELEPHONE ENCOUNTER
Patient calling was seen for Physical  August 10th with Nancyra patient having back pain went for her PT evaluation yesterday and PT will start on Monday. Patient is requesting a excuse note for work states she will be on light duty for the next 2 weeks.

## 2021-08-26 NOTE — TELEPHONE ENCOUNTER
Called pt. No answer, never went to VM. PSR please let pt know that work note at the front available for .

## 2021-08-31 ENCOUNTER — OFFICE VISIT (OUTPATIENT)
Dept: PHYSICAL THERAPY | Age: 36
End: 2021-08-31
Attending: NURSE PRACTITIONER
Payer: COMMERCIAL

## 2021-08-31 PROCEDURE — 97110 THERAPEUTIC EXERCISES: CPT

## 2021-08-31 NOTE — PROGRESS NOTES
Dx: Dorsalgia of thoracic region (M54.6)         Authorized # of Visits:  8  Fall Risk: standard         Precautions: n/a             Subjective: The patient reports she went back to work this past week and she was having a lot of pain initially.   Object

## 2021-09-03 ENCOUNTER — OFFICE VISIT (OUTPATIENT)
Dept: PHYSICAL THERAPY | Age: 36
End: 2021-09-03
Attending: NURSE PRACTITIONER
Payer: COMMERCIAL

## 2021-09-03 PROCEDURE — 97110 THERAPEUTIC EXERCISES: CPT

## 2021-09-03 NOTE — PROGRESS NOTES
Dx: Dorsalgia of thoracic region (M54.6)         Authorized # of Visits:  8  Fall Risk: standard         Precautions: n/a             Subjective: The patient reports she's been busy at work today so she's feeling more sore.   Objective:   See flow sheet

## 2021-09-07 ENCOUNTER — OFFICE VISIT (OUTPATIENT)
Dept: PHYSICAL THERAPY | Age: 36
End: 2021-09-07
Attending: NURSE PRACTITIONER
Payer: COMMERCIAL

## 2021-09-07 PROCEDURE — 97110 THERAPEUTIC EXERCISES: CPT

## 2021-09-07 NOTE — PROGRESS NOTES
Dx: Dorsalgia of thoracic region (M54.6)         Authorized # of Visits:  8  Fall Risk: standard         Precautions: n/a             Subjective:    The patient reports she's having more soreness today and over the weekend    Objective:   See flow sheet  Pa

## 2021-09-10 ENCOUNTER — APPOINTMENT (OUTPATIENT)
Dept: PHYSICAL THERAPY | Age: 36
End: 2021-09-10
Attending: NURSE PRACTITIONER
Payer: COMMERCIAL

## 2021-09-14 ENCOUNTER — OFFICE VISIT (OUTPATIENT)
Dept: PHYSICAL THERAPY | Age: 36
End: 2021-09-14
Attending: NURSE PRACTITIONER
Payer: COMMERCIAL

## 2021-09-14 PROCEDURE — 97110 THERAPEUTIC EXERCISES: CPT

## 2021-09-14 NOTE — PROGRESS NOTES
Dx: Dorsalgia of thoracic region (M54.6)         Authorized # of Visits:  8  Fall Risk: standard         Precautions: n/a             Subjective:    The patient reports her back has been more sore/irritated for the past several days    Objective:   See flow

## 2021-09-17 ENCOUNTER — OFFICE VISIT (OUTPATIENT)
Dept: PHYSICAL THERAPY | Age: 36
End: 2021-09-17
Attending: NURSE PRACTITIONER
Payer: COMMERCIAL

## 2021-09-17 PROCEDURE — 97110 THERAPEUTIC EXERCISES: CPT

## 2021-09-17 NOTE — PROGRESS NOTES
Dx: Dorsalgia of thoracic region (M54.6)         Authorized # of Visits:  8  Fall Risk: standard         Precautions: n/a             Subjective:    The patient reports she's been off work this week as she's changing jobs and she's been feeling a little bet - - -     - - - - -     HEP: Prone I, Seated bilateral shoulder ER with scap retraction band, Doorway pec/rhomboid stretches    Charges:  TherEx x 3       Total Timed Treatment: 40 min  Total Treatment Time: 40 min

## 2021-09-18 DIAGNOSIS — F41.9 ANXIETY AND DEPRESSION: ICD-10-CM

## 2021-09-18 DIAGNOSIS — F32.A ANXIETY AND DEPRESSION: ICD-10-CM

## 2021-09-20 RX ORDER — CLONAZEPAM 0.5 MG/1
TABLET ORAL
Qty: 10 TABLET | Refills: 0 | Status: SHIPPED | OUTPATIENT
Start: 2021-09-20

## 2021-09-20 NOTE — TELEPHONE ENCOUNTER
Medication(s) to Refill:   Requested Prescriptions     Pending Prescriptions Disp Refills   • CLONAZEPAM 0.5 MG Oral Tab [Pharmacy Med Name: Clonazepam 0.5 Mg Tab Acco] 10 tablet 0     Sig: TAKE ONE TABLET BY MOUTH TWICE DAILY AS NEEDED FOR ANXIETY

## 2021-09-21 ENCOUNTER — APPOINTMENT (OUTPATIENT)
Dept: PHYSICAL THERAPY | Age: 36
End: 2021-09-21
Attending: NURSE PRACTITIONER
Payer: COMMERCIAL

## 2021-09-24 ENCOUNTER — APPOINTMENT (OUTPATIENT)
Dept: PHYSICAL THERAPY | Age: 36
End: 2021-09-24
Attending: NURSE PRACTITIONER
Payer: COMMERCIAL

## 2021-09-28 ENCOUNTER — OFFICE VISIT (OUTPATIENT)
Dept: FAMILY MEDICINE CLINIC | Facility: CLINIC | Age: 36
End: 2021-09-28
Payer: COMMERCIAL

## 2021-09-28 ENCOUNTER — LAB ENCOUNTER (OUTPATIENT)
Dept: LAB | Age: 36
End: 2021-09-28
Attending: FAMILY MEDICINE
Payer: COMMERCIAL

## 2021-09-28 VITALS
BODY MASS INDEX: 28.53 KG/M2 | WEIGHT: 161 LBS | RESPIRATION RATE: 18 BRPM | HEIGHT: 63 IN | DIASTOLIC BLOOD PRESSURE: 72 MMHG | HEART RATE: 75 BPM | OXYGEN SATURATION: 98 % | SYSTOLIC BLOOD PRESSURE: 108 MMHG

## 2021-09-28 DIAGNOSIS — M54.6 DORSALGIA OF THORACIC REGION: ICD-10-CM

## 2021-09-28 DIAGNOSIS — R20.0 NUMBNESS AND TINGLING: ICD-10-CM

## 2021-09-28 DIAGNOSIS — M54.6 DORSALGIA OF THORACIC REGION: Primary | ICD-10-CM

## 2021-09-28 DIAGNOSIS — R20.2 NUMBNESS AND TINGLING: ICD-10-CM

## 2021-09-28 LAB
CK SERPL-CCNC: 161 U/L
CRP SERPL-MCNC: 0.33 MG/DL (ref ?–0.3)
FOLATE SERPL-MCNC: 16.6 NG/ML (ref 8.7–?)
SED RATE-ML: 13 MM/HR
VIT B12 SERPL-MCNC: 834 PG/ML (ref 193–986)

## 2021-09-28 PROCEDURE — 82550 ASSAY OF CK (CPK): CPT

## 2021-09-28 PROCEDURE — 3008F BODY MASS INDEX DOCD: CPT | Performed by: FAMILY MEDICINE

## 2021-09-28 PROCEDURE — 36415 COLL VENOUS BLD VENIPUNCTURE: CPT

## 2021-09-28 PROCEDURE — 3078F DIAST BP <80 MM HG: CPT | Performed by: FAMILY MEDICINE

## 2021-09-28 PROCEDURE — 82607 VITAMIN B-12: CPT

## 2021-09-28 PROCEDURE — 3074F SYST BP LT 130 MM HG: CPT | Performed by: FAMILY MEDICINE

## 2021-09-28 PROCEDURE — 82746 ASSAY OF FOLIC ACID SERUM: CPT

## 2021-09-28 PROCEDURE — 83519 RIA NONANTIBODY: CPT

## 2021-09-28 PROCEDURE — 86140 C-REACTIVE PROTEIN: CPT

## 2021-09-28 PROCEDURE — 85652 RBC SED RATE AUTOMATED: CPT

## 2021-09-28 PROCEDURE — 99214 OFFICE O/P EST MOD 30 MIN: CPT | Performed by: FAMILY MEDICINE

## 2021-09-28 NOTE — PROGRESS NOTES
Subjective:   Lashon Villasenor is a 29year old female who presents for Back Pain (ongoing back pain since August 2021, pt reports pain & tingling.)     Back pain   Pressure feeling Has N/T   Sometimes radiating to arms   Tried rest, muscle relaxers and PT appearance. She is well-developed. Cardiovascular:      Rate and Rhythm: Normal rate and regular rhythm. Pulmonary:      Effort: Pulmonary effort is normal.      Breath sounds: Normal breath sounds.    Musculoskeletal:      Cervical back: Normal.      T

## 2021-09-30 LAB — ACETYLCHOLINE BINDING AB: 0 NMOL/L

## 2021-10-04 ENCOUNTER — TELEPHONE (OUTPATIENT)
Dept: FAMILY MEDICINE CLINIC | Facility: CLINIC | Age: 36
End: 2021-10-04

## 2021-10-04 NOTE — TELEPHONE ENCOUNTER
Chuy Sood A  Patient Customer Service Request Pool 16 hours ago (9:24 PM)     CB      Topic: <<Select One of the Topics Below>>.     Am i able to schedule my MRI now?

## 2021-10-06 NOTE — TELEPHONE ENCOUNTER
MRI spine authorized. Notified the patient of the authorized MRI. Patient verbalized understanding. Provided central scheduling phone number. Answered all questions at this time.

## 2021-10-11 ENCOUNTER — TELEPHONE (OUTPATIENT)
Dept: FAMILY MEDICINE CLINIC | Facility: CLINIC | Age: 36
End: 2021-10-11

## 2021-10-11 RX ORDER — AMITRIPTYLINE HYDROCHLORIDE 25 MG/1
25 TABLET, FILM COATED ORAL NIGHTLY
Qty: 30 TABLET | Refills: 0 | Status: SHIPPED | OUTPATIENT
Start: 2021-10-11 | End: 2022-01-10

## 2021-10-11 NOTE — TELEPHONE ENCOUNTER
Patient seen on 8/10/2021 for back pain. Prescribed cyclobenzaprine. Patient seen on 9/28/2021 for back pain. MRI ordered. Scheduled for MRI on 10/14. Patient states that muscle relaxer is not helping with pain. Requesting alternative. Please advise.  Cisco Post

## 2021-10-12 NOTE — TELEPHONE ENCOUNTER
Patient states she picked up her medication yesterday and it states its a anti depressent patient does not want to start this she is wanting something for the back pain

## 2021-10-12 NOTE — TELEPHONE ENCOUNTER
Notified the patient that amitriptyline is used as a nerve pain medication as well. Patient verbalized understanding. States that she will start taking the medication then.  Patient states that she read online that it is an antidepressant and was confused b

## 2021-10-14 ENCOUNTER — HOSPITAL ENCOUNTER (OUTPATIENT)
Dept: MRI IMAGING | Age: 36
Discharge: HOME | End: 2021-10-14
Attending: FAMILY MEDICINE
Payer: COMMERCIAL

## 2021-10-14 DIAGNOSIS — M54.6 DORSALGIA OF THORACIC REGION: ICD-10-CM

## 2021-10-14 DIAGNOSIS — R20.0 NUMBNESS AND TINGLING: ICD-10-CM

## 2021-10-14 DIAGNOSIS — R20.2 NUMBNESS AND TINGLING: ICD-10-CM

## 2021-10-14 PROCEDURE — 72146 MRI CHEST SPINE W/O DYE: CPT | Performed by: FAMILY MEDICINE

## 2021-10-19 NOTE — PROGRESS NOTES
Milena Sit    Dr Naman Cormier reviewed your MRI and it shows no significant abnormalities. If your still having pain please follow up in the office.   Take care,  Alon Angela

## 2021-10-29 ENCOUNTER — HOSPITAL ENCOUNTER (OUTPATIENT)
Dept: CV DIAGNOSTICS | Age: 36
Discharge: HOME | End: 2021-10-29
Attending: NURSE PRACTITIONER
Payer: COMMERCIAL

## 2021-10-29 DIAGNOSIS — R00.2 PALPITATIONS: ICD-10-CM

## 2021-10-29 PROCEDURE — 93227 XTRNL ECG REC<48 HR R&I: CPT | Performed by: NURSE PRACTITIONER

## 2021-10-29 PROCEDURE — 93225 XTRNL ECG REC<48 HRS REC: CPT | Performed by: NURSE PRACTITIONER

## 2021-10-29 PROCEDURE — 93226 XTRNL ECG REC<48 HR SCAN A/R: CPT | Performed by: NURSE PRACTITIONER

## 2021-11-04 ENCOUNTER — TELEPHONE (OUTPATIENT)
Dept: FAMILY MEDICINE CLINIC | Facility: CLINIC | Age: 36
End: 2021-11-04

## 2021-11-04 DIAGNOSIS — G43.109 MIGRAINE WITH AURA AND WITHOUT STATUS MIGRAINOSUS, NOT INTRACTABLE: ICD-10-CM

## 2021-11-04 NOTE — TELEPHONE ENCOUNTER
----- Message from CHEMO Jones sent at 11/4/2021 10:11 AM CDT -----  Normal Holter monitor isolated PAC's -Occasional premature contractions (either atrial or ventricular) are common enough that they're not typically a cause for concern.

## 2021-11-05 RX ORDER — RIZATRIPTAN BENZOATE 10 MG/1
TABLET ORAL
Qty: 10 TABLET | Refills: 0 | Status: SHIPPED | OUTPATIENT
Start: 2021-11-05 | End: 2022-01-12

## 2021-11-05 NOTE — TELEPHONE ENCOUNTER
Medication(s) to Refill:   Requested Prescriptions     Pending Prescriptions Disp Refills   • RIZATRIPTAN BENZOATE 10 MG Oral Tab [Pharmacy Med Name: Rizatriptan Benzoate 10 Mg Tab Auro] 10 tablet 0     Sig: take one tablet by mouth as needed for migrane.

## 2022-01-03 ENCOUNTER — TELEMEDICINE (OUTPATIENT)
Dept: FAMILY MEDICINE CLINIC | Facility: CLINIC | Age: 37
End: 2022-01-03

## 2022-01-03 DIAGNOSIS — Z20.822 CONTACT WITH AND (SUSPECTED) EXPOSURE TO COVID-19: Primary | ICD-10-CM

## 2022-01-03 PROCEDURE — 99213 OFFICE O/P EST LOW 20 MIN: CPT | Performed by: FAMILY MEDICINE

## 2022-01-03 NOTE — PROGRESS NOTES
Telehealth outside of [address] Verbal Consent   I conducted a telehealth visit with Casey Quintana today, 01/03/22, which was completed using two-way, real-time interactive audio and video communication.  This has been done in good jarvis to nettie vaccine was September 2021     Works as       History/Other:   Chief Complaint Reviewed and Verified  No Further Nursing Notes to Review          Current Outpatient Medications   Medication Sig Dispense Refill   • RIZATRIPTAN BENZOATE 10

## 2022-01-10 ENCOUNTER — TELEPHONE (OUTPATIENT)
Dept: FAMILY MEDICINE CLINIC | Facility: CLINIC | Age: 37
End: 2022-01-10

## 2022-01-10 ENCOUNTER — OFFICE VISIT (OUTPATIENT)
Dept: FAMILY MEDICINE CLINIC | Facility: CLINIC | Age: 37
End: 2022-01-10
Payer: COMMERCIAL

## 2022-01-10 VITALS
RESPIRATION RATE: 16 BRPM | OXYGEN SATURATION: 100 % | DIASTOLIC BLOOD PRESSURE: 70 MMHG | BODY MASS INDEX: 30.12 KG/M2 | HEIGHT: 63 IN | SYSTOLIC BLOOD PRESSURE: 114 MMHG | HEART RATE: 75 BPM | WEIGHT: 170 LBS | TEMPERATURE: 98 F

## 2022-01-10 DIAGNOSIS — R07.89 OTHER CHEST PAIN: ICD-10-CM

## 2022-01-10 DIAGNOSIS — R09.1 PLEURISY: Primary | ICD-10-CM

## 2022-01-10 PROCEDURE — 93000 ELECTROCARDIOGRAM COMPLETE: CPT | Performed by: FAMILY MEDICINE

## 2022-01-10 PROCEDURE — 3078F DIAST BP <80 MM HG: CPT | Performed by: FAMILY MEDICINE

## 2022-01-10 PROCEDURE — 3074F SYST BP LT 130 MM HG: CPT | Performed by: FAMILY MEDICINE

## 2022-01-10 PROCEDURE — 99214 OFFICE O/P EST MOD 30 MIN: CPT | Performed by: FAMILY MEDICINE

## 2022-01-10 PROCEDURE — 3008F BODY MASS INDEX DOCD: CPT | Performed by: FAMILY MEDICINE

## 2022-01-10 RX ORDER — PREDNISONE 10 MG/1
TABLET ORAL
Qty: 23 TABLET | Refills: 0 | Status: SHIPPED | OUTPATIENT
Start: 2022-01-10 | End: 2022-01-23

## 2022-01-10 RX ORDER — AZITHROMYCIN 250 MG/1
TABLET, FILM COATED ORAL
Qty: 6 TABLET | Refills: 0 | Status: SHIPPED | OUTPATIENT
Start: 2022-01-10 | End: 2022-01-15

## 2022-01-10 RX ORDER — ALBUTEROL SULFATE 90 UG/1
2 AEROSOL, METERED RESPIRATORY (INHALATION) EVERY 4 HOURS PRN
Qty: 18 G | Refills: 0 | Status: SHIPPED | OUTPATIENT
Start: 2022-01-10

## 2022-01-10 NOTE — TELEPHONE ENCOUNTER
Notified the patient of the below response by the provider. Patient verbalized understanding. States that she left work early because of the chest pain and continuous coughing.    Was going to wait to  her daughter from school then go to the immediat

## 2022-01-10 NOTE — PATIENT INSTRUCTIONS
Pleurisy  You have pain in your chest. Your healthcare provider has told you that you have pleurisy (pleuritis). Pleurisy is swelling (inflammation), irritation, or infection of the pleura.  The pleura are 2 layers of thin, smooth tissue that surround t causes of chest pain. You may have:   · Lab tests  · Imaging tests such as chest X-ray, CT scan, Chest MRI, or ultrasound  · ECG  · Arterial blood gas test  · Fluid taken from the pleural space (thoracentesis) and checked in a lab  How is pleurisy treated?

## 2022-01-10 NOTE — TELEPHONE ENCOUNTER
What kind of chest pain? Can she check pulse ox? She was assessed on 1/3, covid test ordered- but not completed? If she is having SOB with chest pain, she should not be at work.

## 2022-01-10 NOTE — PROGRESS NOTES
Subjective:   Jazmín Newton is a 29year old female who presents for Chest Pain     Chest pain after URI   Tested negative for covid on 1/4/21   States she has been coughing for 2 weeks   Pain is sharp, stabbing - mostly right sided   No fevers.  Cough is body mass index is 30.11 kg/m² as calculated from the following:    Height as of this encounter: 5' 3\" (1.6 m). Weight as of this encounter: 170 lb (77.1 kg). Physical Exam  Constitutional:       General: She is not in acute distress.      Appearance:

## 2022-01-10 NOTE — TELEPHONE ENCOUNTER
If she isn't going to go to ER now (if the chest pain isn't bad enough to make her feel she needs to leave work -  We need to get her into the office later today - 5:20

## 2022-01-10 NOTE — TELEPHONE ENCOUNTER
Patient states that the pain is located on the right side of her chest.   Described as a stabbing pain and \"pinch feeling. \"  Chest also feels sore due to coughing. Chest pain occurs when moving and when taking deep breaths.   Feels pressure on chest whe

## 2022-01-10 NOTE — TELEPHONE ENCOUNTER
Patient calling the office. States that she has had cold symptoms since Christmas 2021. Current symptoms include stuffy nose and chest congestion. Additionally, patient started experiencing chest pain on and off today.  Advised patient to go to ER or IC for

## 2022-01-12 DIAGNOSIS — G43.109 MIGRAINE WITH AURA AND WITHOUT STATUS MIGRAINOSUS, NOT INTRACTABLE: ICD-10-CM

## 2022-01-12 RX ORDER — RIZATRIPTAN BENZOATE 10 MG/1
10 TABLET ORAL AS NEEDED
Qty: 10 TABLET | Refills: 3 | Status: SHIPPED | OUTPATIENT
Start: 2022-01-12 | End: 2022-09-01

## 2022-09-01 DIAGNOSIS — G43.109 MIGRAINE WITH AURA AND WITHOUT STATUS MIGRAINOSUS, NOT INTRACTABLE: ICD-10-CM

## 2022-09-01 RX ORDER — RIZATRIPTAN BENZOATE 10 MG/1
10 TABLET ORAL AS NEEDED
Qty: 10 TABLET | Refills: 0 | OUTPATIENT
Start: 2022-09-01

## 2022-09-01 RX ORDER — RIZATRIPTAN BENZOATE 10 MG/1
TABLET ORAL
Qty: 10 TABLET | Refills: 0 | OUTPATIENT
Start: 2022-09-01

## 2022-09-01 RX ORDER — RIZATRIPTAN BENZOATE 10 MG/1
10 TABLET ORAL AS NEEDED
Qty: 10 TABLET | Refills: 2 | Status: SHIPPED | OUTPATIENT
Start: 2022-09-01 | End: 2023-01-06

## 2022-12-07 DIAGNOSIS — Z00.00 LABORATORY EXAMINATION ORDERED AS PART OF A ROUTINE GENERAL MEDICAL EXAMINATION: Primary | ICD-10-CM

## 2022-12-07 DIAGNOSIS — F32.A ANXIETY AND DEPRESSION: ICD-10-CM

## 2022-12-07 DIAGNOSIS — F41.9 ANXIETY AND DEPRESSION: ICD-10-CM

## 2022-12-07 RX ORDER — CLONAZEPAM 0.5 MG/1
0.5 TABLET ORAL 2 TIMES DAILY PRN
Qty: 10 TABLET | Refills: 0 | Status: SHIPPED | OUTPATIENT
Start: 2022-12-07

## 2022-12-13 ENCOUNTER — LAB ENCOUNTER (OUTPATIENT)
Dept: LAB | Age: 37
End: 2022-12-13
Attending: FAMILY MEDICINE
Payer: COMMERCIAL

## 2022-12-13 DIAGNOSIS — Z00.00 LABORATORY EXAMINATION ORDERED AS PART OF A ROUTINE GENERAL MEDICAL EXAMINATION: ICD-10-CM

## 2022-12-13 LAB
ALBUMIN SERPL-MCNC: 3.8 G/DL (ref 3.4–5)
ALBUMIN/GLOB SERPL: 1.5 {RATIO} (ref 1–2)
ALP LIVER SERPL-CCNC: 41 U/L
ALT SERPL-CCNC: 13 U/L
ANION GAP SERPL CALC-SCNC: 4 MMOL/L (ref 0–18)
AST SERPL-CCNC: 7 U/L (ref 15–37)
BASOPHILS # BLD AUTO: 0.03 X10(3) UL (ref 0–0.2)
BASOPHILS NFR BLD AUTO: 0.5 %
BILIRUB SERPL-MCNC: 0.6 MG/DL (ref 0.1–2)
BUN BLD-MCNC: 11 MG/DL (ref 7–18)
CALCIUM BLD-MCNC: 9 MG/DL (ref 8.5–10.1)
CHLORIDE SERPL-SCNC: 110 MMOL/L (ref 98–112)
CHOLEST SERPL-MCNC: 141 MG/DL (ref ?–200)
CO2 SERPL-SCNC: 26 MMOL/L (ref 21–32)
CREAT BLD-MCNC: 0.75 MG/DL
EOSINOPHIL # BLD AUTO: 0.09 X10(3) UL (ref 0–0.7)
EOSINOPHIL NFR BLD AUTO: 1.5 %
ERYTHROCYTE [DISTWIDTH] IN BLOOD BY AUTOMATED COUNT: 12.8 %
FASTING PATIENT LIPID ANSWER: YES
FASTING STATUS PATIENT QL REPORTED: YES
GFR SERPLBLD BASED ON 1.73 SQ M-ARVRAT: 106 ML/MIN/1.73M2 (ref 60–?)
GLOBULIN PLAS-MCNC: 2.6 G/DL (ref 2.8–4.4)
GLUCOSE BLD-MCNC: 89 MG/DL (ref 70–99)
HCT VFR BLD AUTO: 37.4 %
HDLC SERPL-MCNC: 83 MG/DL (ref 40–59)
HGB BLD-MCNC: 12.5 G/DL
IMM GRANULOCYTES # BLD AUTO: 0.01 X10(3) UL (ref 0–1)
IMM GRANULOCYTES NFR BLD: 0.2 %
LDLC SERPL CALC-MCNC: 50 MG/DL (ref ?–100)
LYMPHOCYTES # BLD AUTO: 2.09 X10(3) UL (ref 1–4)
LYMPHOCYTES NFR BLD AUTO: 35.7 %
MCH RBC QN AUTO: 32.6 PG (ref 26–34)
MCHC RBC AUTO-ENTMCNC: 33.4 G/DL (ref 31–37)
MCV RBC AUTO: 97.7 FL
MONOCYTES # BLD AUTO: 0.58 X10(3) UL (ref 0.1–1)
MONOCYTES NFR BLD AUTO: 9.9 %
NEUTROPHILS # BLD AUTO: 3.05 X10 (3) UL (ref 1.5–7.7)
NEUTROPHILS # BLD AUTO: 3.05 X10(3) UL (ref 1.5–7.7)
NEUTROPHILS NFR BLD AUTO: 52.2 %
NONHDLC SERPL-MCNC: 58 MG/DL (ref ?–130)
OSMOLALITY SERPL CALC.SUM OF ELEC: 289 MOSM/KG (ref 275–295)
PLATELET # BLD AUTO: 247 10(3)UL (ref 150–450)
POTASSIUM SERPL-SCNC: 4 MMOL/L (ref 3.5–5.1)
PROT SERPL-MCNC: 6.4 G/DL (ref 6.4–8.2)
RBC # BLD AUTO: 3.83 X10(6)UL
SODIUM SERPL-SCNC: 140 MMOL/L (ref 136–145)
TRIGL SERPL-MCNC: 28 MG/DL (ref 30–149)
TSI SER-ACNC: 0.9 MIU/ML (ref 0.36–3.74)
VLDLC SERPL CALC-MCNC: 4 MG/DL (ref 0–30)
WBC # BLD AUTO: 5.9 X10(3) UL (ref 4–11)

## 2022-12-13 PROCEDURE — 80050 GENERAL HEALTH PANEL: CPT | Performed by: FAMILY MEDICINE

## 2022-12-13 PROCEDURE — 80061 LIPID PANEL: CPT | Performed by: FAMILY MEDICINE

## 2022-12-14 ENCOUNTER — OFFICE VISIT (OUTPATIENT)
Dept: FAMILY MEDICINE CLINIC | Facility: CLINIC | Age: 37
End: 2022-12-14
Payer: COMMERCIAL

## 2022-12-14 VITALS
HEART RATE: 60 BPM | RESPIRATION RATE: 16 BRPM | HEIGHT: 63 IN | SYSTOLIC BLOOD PRESSURE: 120 MMHG | BODY MASS INDEX: 29.59 KG/M2 | TEMPERATURE: 98 F | WEIGHT: 167 LBS | OXYGEN SATURATION: 99 % | DIASTOLIC BLOOD PRESSURE: 74 MMHG

## 2022-12-14 DIAGNOSIS — F33.2 SEVERE EPISODE OF RECURRENT MAJOR DEPRESSIVE DISORDER, WITHOUT PSYCHOTIC FEATURES (HCC): ICD-10-CM

## 2022-12-14 DIAGNOSIS — Z00.00 ROUTINE GENERAL MEDICAL EXAMINATION AT A HEALTH CARE FACILITY: Primary | ICD-10-CM

## 2022-12-14 PROCEDURE — 3078F DIAST BP <80 MM HG: CPT | Performed by: FAMILY MEDICINE

## 2022-12-14 PROCEDURE — 3074F SYST BP LT 130 MM HG: CPT | Performed by: FAMILY MEDICINE

## 2022-12-14 PROCEDURE — 99395 PREV VISIT EST AGE 18-39: CPT | Performed by: FAMILY MEDICINE

## 2022-12-14 PROCEDURE — 3008F BODY MASS INDEX DOCD: CPT | Performed by: FAMILY MEDICINE

## 2022-12-14 NOTE — PATIENT INSTRUCTIONS
Stacey Hoyt     For immediate assistance call   [phone]      To reach any of these locations please call [phone]. Suicide hotline numbers  9-8-8  [facility] Mental StagedNe.  National [place] on Mental https://frank.org/. org   Mental Health [employer].co.nz. org  National Suicide Obueijl615-808-1408 (800-SUICIDE)  National Suicide Prevention Onhlxwde280-435-5433 (WeekendScores.is. org

## 2023-02-13 ENCOUNTER — PATIENT MESSAGE (OUTPATIENT)
Dept: FAMILY MEDICINE CLINIC | Facility: CLINIC | Age: 38
End: 2023-02-13

## 2023-03-21 ENCOUNTER — LAB ENCOUNTER (OUTPATIENT)
Dept: LAB | Age: 38
End: 2023-03-21
Attending: FAMILY MEDICINE
Payer: COMMERCIAL

## 2023-03-21 ENCOUNTER — OFFICE VISIT (OUTPATIENT)
Dept: FAMILY MEDICINE CLINIC | Facility: CLINIC | Age: 38
End: 2023-03-21
Payer: COMMERCIAL

## 2023-03-21 VITALS
WEIGHT: 171 LBS | TEMPERATURE: 98 F | RESPIRATION RATE: 16 BRPM | BODY MASS INDEX: 30.3 KG/M2 | OXYGEN SATURATION: 97 % | HEIGHT: 63 IN | DIASTOLIC BLOOD PRESSURE: 70 MMHG | HEART RATE: 80 BPM | SYSTOLIC BLOOD PRESSURE: 110 MMHG

## 2023-03-21 DIAGNOSIS — R20.2 NUMBNESS AND TINGLING: ICD-10-CM

## 2023-03-21 DIAGNOSIS — F41.9 ANXIETY AND DEPRESSION: ICD-10-CM

## 2023-03-21 DIAGNOSIS — F50.89 PATHOLOGIC ICE EATING: ICD-10-CM

## 2023-03-21 DIAGNOSIS — F32.A ANXIETY AND DEPRESSION: ICD-10-CM

## 2023-03-21 DIAGNOSIS — R20.0 NUMBNESS AND TINGLING: ICD-10-CM

## 2023-03-21 DIAGNOSIS — E86.0 DEHYDRATION AFTER EXERTION: ICD-10-CM

## 2023-03-21 DIAGNOSIS — G43.109 MIGRAINE WITH AURA AND WITHOUT STATUS MIGRAINOSUS, NOT INTRACTABLE: Primary | ICD-10-CM

## 2023-03-21 LAB
ANION GAP SERPL CALC-SCNC: 0 MMOL/L (ref 0–18)
BUN BLD-MCNC: 9 MG/DL (ref 7–18)
CALCIUM BLD-MCNC: 9.2 MG/DL (ref 8.5–10.1)
CHLORIDE SERPL-SCNC: 108 MMOL/L (ref 98–112)
CO2 SERPL-SCNC: 30 MMOL/L (ref 21–32)
CREAT BLD-MCNC: 0.83 MG/DL
DEPRECATED HBV CORE AB SER IA-ACNC: 10 NG/ML
ERYTHROCYTE [DISTWIDTH] IN BLOOD BY AUTOMATED COUNT: 12.4 %
FASTING STATUS PATIENT QL REPORTED: NO
GFR SERPLBLD BASED ON 1.73 SQ M-ARVRAT: 94 ML/MIN/1.73M2 (ref 60–?)
GLUCOSE BLD-MCNC: 85 MG/DL (ref 70–99)
HCT VFR BLD AUTO: 39.1 %
HGB BLD-MCNC: 12.9 G/DL
IRON SATN MFR SERPL: 19 %
IRON SERPL-MCNC: 82 UG/DL
MCH RBC QN AUTO: 32.3 PG (ref 26–34)
MCHC RBC AUTO-ENTMCNC: 33 G/DL (ref 31–37)
MCV RBC AUTO: 97.8 FL
OSMOLALITY SERPL CALC.SUM OF ELEC: 284 MOSM/KG (ref 275–295)
PLATELET # BLD AUTO: 271 10(3)UL (ref 150–450)
POTASSIUM SERPL-SCNC: 3.7 MMOL/L (ref 3.5–5.1)
RBC # BLD AUTO: 4 X10(6)UL
SODIUM SERPL-SCNC: 138 MMOL/L (ref 136–145)
TIBC SERPL-MCNC: 438 UG/DL (ref 240–450)
TRANSFERRIN SERPL-MCNC: 294 MG/DL (ref 200–360)
VIT B12 SERPL-MCNC: 687 PG/ML (ref 193–986)
WBC # BLD AUTO: 7.1 X10(3) UL (ref 4–11)

## 2023-03-21 PROCEDURE — 82728 ASSAY OF FERRITIN: CPT | Performed by: FAMILY MEDICINE

## 2023-03-21 PROCEDURE — 85027 COMPLETE CBC AUTOMATED: CPT | Performed by: FAMILY MEDICINE

## 2023-03-21 PROCEDURE — 82607 VITAMIN B-12: CPT | Performed by: FAMILY MEDICINE

## 2023-03-21 PROCEDURE — 3078F DIAST BP <80 MM HG: CPT | Performed by: FAMILY MEDICINE

## 2023-03-21 PROCEDURE — 83540 ASSAY OF IRON: CPT | Performed by: FAMILY MEDICINE

## 2023-03-21 PROCEDURE — 83550 IRON BINDING TEST: CPT | Performed by: FAMILY MEDICINE

## 2023-03-21 PROCEDURE — 80048 BASIC METABOLIC PNL TOTAL CA: CPT | Performed by: FAMILY MEDICINE

## 2023-03-21 PROCEDURE — 3074F SYST BP LT 130 MM HG: CPT | Performed by: FAMILY MEDICINE

## 2023-03-21 PROCEDURE — 3008F BODY MASS INDEX DOCD: CPT | Performed by: FAMILY MEDICINE

## 2023-03-21 PROCEDURE — 99214 OFFICE O/P EST MOD 30 MIN: CPT | Performed by: FAMILY MEDICINE

## 2023-03-21 NOTE — PATIENT INSTRUCTIONS
If headaches and dehydration do not improve - then consider preventative medication or seeing neurology for further testing.

## 2023-07-05 ENCOUNTER — TELEPHONE (OUTPATIENT)
Dept: FAMILY MEDICINE CLINIC | Facility: CLINIC | Age: 38
End: 2023-07-05
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
## 2023-07-07 ENCOUNTER — OFFICE VISIT (OUTPATIENT)
Dept: FAMILY MEDICINE CLINIC | Facility: CLINIC | Age: 38
End: 2023-07-07
Payer: COMMERCIAL

## 2023-07-07 VITALS
WEIGHT: 170 LBS | DIASTOLIC BLOOD PRESSURE: 70 MMHG | BODY MASS INDEX: 30.12 KG/M2 | HEART RATE: 86 BPM | TEMPERATURE: 98 F | RESPIRATION RATE: 20 BRPM | HEIGHT: 63 IN | SYSTOLIC BLOOD PRESSURE: 100 MMHG | OXYGEN SATURATION: 99 %

## 2023-07-07 DIAGNOSIS — M79.2 RADICULAR PAIN IN RIGHT ARM: Primary | ICD-10-CM

## 2023-07-07 PROCEDURE — 3078F DIAST BP <80 MM HG: CPT | Performed by: FAMILY MEDICINE

## 2023-07-07 PROCEDURE — 3008F BODY MASS INDEX DOCD: CPT | Performed by: FAMILY MEDICINE

## 2023-07-07 PROCEDURE — 3074F SYST BP LT 130 MM HG: CPT | Performed by: FAMILY MEDICINE

## 2023-07-07 PROCEDURE — 99214 OFFICE O/P EST MOD 30 MIN: CPT | Performed by: FAMILY MEDICINE

## 2023-07-07 RX ORDER — CYCLOBENZAPRINE HCL 5 MG
TABLET ORAL 3 TIMES DAILY PRN
Qty: 60 TABLET | Refills: 0 | Status: SHIPPED | OUTPATIENT
Start: 2023-07-07

## 2023-07-07 RX ORDER — PREDNISONE 20 MG/1
40 TABLET ORAL DAILY
Qty: 10 TABLET | Refills: 0 | Status: SHIPPED | OUTPATIENT
Start: 2023-07-07 | End: 2023-07-12

## 2023-08-31 ENCOUNTER — HOSPITAL ENCOUNTER (OUTPATIENT)
Age: 38
Discharge: HOME | End: 2023-08-31
Payer: COMMERCIAL

## 2023-08-31 VITALS
SYSTOLIC BLOOD PRESSURE: 122 MMHG | RESPIRATION RATE: 18 BRPM | HEART RATE: 82 BPM | WEIGHT: 165 LBS | BODY MASS INDEX: 29 KG/M2 | OXYGEN SATURATION: 99 % | DIASTOLIC BLOOD PRESSURE: 69 MMHG | TEMPERATURE: 98 F

## 2023-08-31 DIAGNOSIS — K64.4 EXTERNAL HEMORRHOID, BLEEDING: Primary | ICD-10-CM

## 2023-08-31 PROCEDURE — 99213 OFFICE O/P EST LOW 20 MIN: CPT

## 2023-08-31 PROCEDURE — 99204 OFFICE O/P NEW MOD 45 MIN: CPT

## 2023-08-31 RX ORDER — DOCUSATE SODIUM 100 MG/1
100 CAPSULE, LIQUID FILLED ORAL 2 TIMES DAILY
Qty: 60 CAPSULE | Refills: 0 | Status: SHIPPED | OUTPATIENT
Start: 2023-08-31 | End: 2023-09-30

## 2023-08-31 RX ORDER — HYDROCORTISONE ACETATE 25 MG/1
25 SUPPOSITORY RECTAL 2 TIMES DAILY PRN
Qty: 12 SUPPOSITORY | Refills: 0 | Status: SHIPPED | OUTPATIENT
Start: 2023-08-31 | End: 2023-09-30

## 2023-08-31 RX ORDER — LIDOCAINE 5 G/100G
1 CREAM RECTAL; TOPICAL 2 TIMES DAILY
Qty: 1 EACH | Refills: 0 | Status: SHIPPED | OUTPATIENT
Start: 2023-08-31 | End: 2023-09-07

## 2023-08-31 NOTE — DISCHARGE INSTRUCTIONS
Use medications as directed. Follow-up with surgery. If any abscess formation, fever, or worsening symptoms go to the emergency room.

## 2023-08-31 NOTE — ED INITIAL ASSESSMENT (HPI)
Known internal hemorrhoid - rectal discomfort for a week and bleeding since yesterday; today with headache and tired
Statement Selected

## 2023-12-08 ENCOUNTER — OFFICE VISIT (OUTPATIENT)
Dept: FAMILY MEDICINE CLINIC | Facility: CLINIC | Age: 38
End: 2023-12-08
Payer: COMMERCIAL

## 2023-12-08 VITALS
WEIGHT: 165 LBS | HEIGHT: 63 IN | SYSTOLIC BLOOD PRESSURE: 121 MMHG | HEART RATE: 95 BPM | DIASTOLIC BLOOD PRESSURE: 69 MMHG | BODY MASS INDEX: 29.23 KG/M2 | TEMPERATURE: 100 F | OXYGEN SATURATION: 99 %

## 2023-12-08 DIAGNOSIS — J02.9 SORE THROAT: ICD-10-CM

## 2023-12-08 DIAGNOSIS — Z20.822 SUSPECTED COVID-19 VIRUS INFECTION: Primary | ICD-10-CM

## 2023-12-08 LAB
CONTROL LINE PRESENT WITH A CLEAR BACKGROUND (YES/NO): YES YES/NO
KIT LOT #: NORMAL NUMERIC
OPERATOR ID: ABNORMAL
POCT LOT NUMBER: ABNORMAL
RAPID SARS-COV-2 BY PCR: DETECTED
STREP GRP A CUL-SCR: NEGATIVE

## 2023-12-08 NOTE — PATIENT INSTRUCTIONS
OTC symptoms support  Isolation discussed  Follow up with PCP   Emergent signs and symptoms discussed  If worse seek treatment

## 2023-12-27 ENCOUNTER — TELEPHONE (OUTPATIENT)
Dept: FAMILY MEDICINE CLINIC | Facility: CLINIC | Age: 38
End: 2023-12-27

## 2023-12-27 NOTE — TELEPHONE ENCOUNTER
Pt gets sick a lot. Has had covid about 5 times and when she gets sick, she gets really sick, its never just a basic cold. Mom is asking if there is something like a test to check her immune system.

## 2023-12-27 NOTE — TELEPHONE ENCOUNTER
7/7/23 last OV for right arm pain    Last physical 12/14/22    Please have pt schedule for her physical at which time this issue can be discussed.

## 2024-01-08 ENCOUNTER — TELEPHONE (OUTPATIENT)
Dept: FAMILY MEDICINE CLINIC | Facility: CLINIC | Age: 39
End: 2024-01-08

## 2024-01-08 DIAGNOSIS — G43.109 MIGRAINE WITH AURA AND WITHOUT STATUS MIGRAINOSUS, NOT INTRACTABLE: Primary | ICD-10-CM

## 2024-01-08 NOTE — TELEPHONE ENCOUNTER
Patient requesting contact information for neurologist to be sent to [facility] because she is currently at work.   Patient states that when she went to  it was for flu symptoms. At that point, headache had resolved. She only mentioned to the provider that she had a headache for 3.5 days and the provider mentioned that there are medication options to help.

## 2024-01-08 NOTE — TELEPHONE ENCOUNTER
Yes - ok to refer to [facility]  New neurologist Dr. Weems   Or refer to Dr. Asif    No outside records in care everywhere

## 2024-01-08 NOTE — TELEPHONE ENCOUNTER
Mom said that pt had a horrible migraine that started Thursday 1/4 and lasted to 1/7, she was puking and she could barely move. She was using ice packs on her head and was trying to sleep as much as possible. She went to a  and they gave her a medication to take daily and told her to see a neurologist.     Pt is trying to find the name of medication they gave her. Pt would like a recommendation for a neurologist.

## 2024-01-15 DIAGNOSIS — G43.109 MIGRAINE WITH AURA AND WITHOUT STATUS MIGRAINOSUS, NOT INTRACTABLE: ICD-10-CM

## 2024-01-15 RX ORDER — RIZATRIPTAN BENZOATE 10 MG/1
10 TABLET ORAL AS NEEDED
Qty: 10 TABLET | Refills: 0 | Status: SHIPPED | OUTPATIENT
Start: 2024-01-15

## 2024-01-15 NOTE — TELEPHONE ENCOUNTER
Medication(s) to Refill:   Requested Prescriptions     Pending Prescriptions Disp Refills    RIZATRIPTAN BENZOATE 10 MG Oral Tab [Pharmacy Med Name: Rizatriptan Benzoate 10 Mg Tab Auro] 10 tablet 0     Sig: TAKE ONE TABLET BY MOUTH AS NEEDED FOR MIGRIANE. MAY REPEAT IN 2 HOURS IF NEEDED. MAX OF 2 TABLETS IN 24 HOURS         Reason for Medication Refill being sent to Provider / Reason Protocol Failed:  [] 90 day refill has already been granted  [] Blood Pressure out of range  [] Labs Abnormal/over due  [] Medication not previously prescribed by Provider  [] Non-Protocol Medication  [] Controlled Substance   [] Due for appointment- no future appointment scheduled  [] No Follow up specified      Last Time Medication was Filled:  6/24/23      Last Office Visit with PCP: 7/7/23    When Patient was Due Back to the Office:  4 weeks  (from when PCP last addressed condition)    Future Appointments:  Future Appointments   Date Time Provider Department Center   1/30/2024 11:00 AM Lauren José MD [facility]   2/2/2024  8:15 AM Nishi Weems DO [facility]         Last Blood Pressures:  BP Readings from Last 2 Encounters:   12/08/23 121/69   08/31/23 122/69         Action taken:  [] Refill approved per protocol  [] Routing to provider for approval

## 2024-01-23 ENCOUNTER — TELEPHONE (OUTPATIENT)
Dept: INTERNAL MEDICINE CLINIC | Facility: CLINIC | Age: 39
End: 2024-01-23

## 2024-01-23 DIAGNOSIS — Z00.00 ROUTINE GENERAL MEDICAL EXAMINATION AT A HEALTH CARE FACILITY: Primary | ICD-10-CM

## 2024-01-23 DIAGNOSIS — E61.1 IRON DEFICIENCY: ICD-10-CM

## 2024-01-30 ENCOUNTER — OFFICE VISIT (OUTPATIENT)
Dept: FAMILY MEDICINE CLINIC | Facility: CLINIC | Age: 39
End: 2024-01-30
Payer: COMMERCIAL

## 2024-01-30 ENCOUNTER — LAB ENCOUNTER (OUTPATIENT)
Dept: LAB | Age: 39
End: 2024-01-30
Attending: FAMILY MEDICINE
Payer: COMMERCIAL

## 2024-01-30 VITALS
RESPIRATION RATE: 16 BRPM | SYSTOLIC BLOOD PRESSURE: 110 MMHG | HEART RATE: 80 BPM | OXYGEN SATURATION: 99 % | HEIGHT: 63 IN | TEMPERATURE: 98 F | BODY MASS INDEX: 31.54 KG/M2 | DIASTOLIC BLOOD PRESSURE: 80 MMHG | WEIGHT: 178 LBS

## 2024-01-30 DIAGNOSIS — E61.1 IRON DEFICIENCY: ICD-10-CM

## 2024-01-30 DIAGNOSIS — G43.109 MIGRAINE WITH AURA AND WITHOUT STATUS MIGRAINOSUS, NOT INTRACTABLE: ICD-10-CM

## 2024-01-30 DIAGNOSIS — M79.671 CHRONIC HEEL PAIN, RIGHT: ICD-10-CM

## 2024-01-30 DIAGNOSIS — G89.29 CHRONIC HEEL PAIN, RIGHT: ICD-10-CM

## 2024-01-30 DIAGNOSIS — Z00.00 ROUTINE PHYSICAL EXAMINATION: Primary | ICD-10-CM

## 2024-01-30 DIAGNOSIS — Z00.00 ROUTINE GENERAL MEDICAL EXAMINATION AT A HEALTH CARE FACILITY: ICD-10-CM

## 2024-01-30 DIAGNOSIS — F32.A ANXIETY AND DEPRESSION: ICD-10-CM

## 2024-01-30 DIAGNOSIS — F41.9 ANXIETY AND DEPRESSION: ICD-10-CM

## 2024-01-30 DIAGNOSIS — F98.8 ATTENTION DEFICIT DISORDER (ADD) WITHOUT HYPERACTIVITY: ICD-10-CM

## 2024-01-30 LAB
ALBUMIN SERPL-MCNC: 3.9 G/DL (ref 3.4–5)
ALBUMIN/GLOB SERPL: 1.3 {RATIO} (ref 1–2)
ALP LIVER SERPL-CCNC: 45 U/L
ANION GAP SERPL CALC-SCNC: <0 MMOL/L (ref 0–18)
AST SERPL-CCNC: 11 U/L (ref 15–37)
BASOPHILS # BLD AUTO: 0.03 X10(3) UL (ref 0–0.2)
BASOPHILS NFR BLD AUTO: 0.5 %
BILIRUB SERPL-MCNC: 0.6 MG/DL (ref 0.1–2)
BUN BLD-MCNC: 10 MG/DL (ref 9–23)
CALCIUM BLD-MCNC: 9.1 MG/DL (ref 8.5–10.1)
CHLORIDE SERPL-SCNC: 110 MMOL/L (ref 98–112)
CHOLEST SERPL-MCNC: 161 MG/DL (ref ?–200)
CO2 SERPL-SCNC: 31 MMOL/L (ref 21–32)
CREAT BLD-MCNC: 0.64 MG/DL
DEPRECATED HBV CORE AB SER IA-ACNC: 9.4 NG/ML
EGFRCR SERPLBLD CKD-EPI 2021: 117 ML/MIN/1.73M2 (ref 60–?)
EOSINOPHIL # BLD AUTO: 0.07 X10(3) UL (ref 0–0.7)
EOSINOPHIL NFR BLD AUTO: 1.1 %
ERYTHROCYTE [DISTWIDTH] IN BLOOD BY AUTOMATED COUNT: 13.2 %
FASTING PATIENT LIPID ANSWER: YES
FASTING STATUS PATIENT QL REPORTED: YES
GLOBULIN PLAS-MCNC: 2.9 G/DL (ref 2.8–4.4)
GLUCOSE BLD-MCNC: 88 MG/DL (ref 70–99)
HCT VFR BLD AUTO: 37.3 %
HDLC SERPL-MCNC: 84 MG/DL (ref 40–59)
HGB BLD-MCNC: 12.3 G/DL
IMM GRANULOCYTES # BLD AUTO: 0.02 X10(3) UL (ref 0–1)
IMM GRANULOCYTES NFR BLD: 0.3 %
IRON SATN MFR SERPL: 21 %
IRON SERPL-MCNC: 83 UG/DL
LDLC SERPL CALC-MCNC: 70 MG/DL (ref ?–100)
LYMPHOCYTES # BLD AUTO: 1.7 X10(3) UL (ref 1–4)
LYMPHOCYTES NFR BLD AUTO: 27.2 %
MCH RBC QN AUTO: 31.5 PG (ref 26–34)
MCHC RBC AUTO-ENTMCNC: 33 G/DL (ref 31–37)
MCV RBC AUTO: 95.4 FL
MONOCYTES # BLD AUTO: 0.6 X10(3) UL (ref 0.1–1)
MONOCYTES NFR BLD AUTO: 9.6 %
NEUTROPHILS # BLD AUTO: 3.84 X10 (3) UL (ref 1.5–7.7)
NEUTROPHILS # BLD AUTO: 3.84 X10(3) UL (ref 1.5–7.7)
NEUTROPHILS NFR BLD AUTO: 61.3 %
NONHDLC SERPL-MCNC: 77 MG/DL (ref ?–130)
OSMOLALITY SERPL CALC.SUM OF ELEC: 288 MOSM/KG (ref 275–295)
PLATELET # BLD AUTO: 254 10(3)UL (ref 150–450)
POTASSIUM SERPL-SCNC: 3.9 MMOL/L (ref 3.5–5.1)
PROT SERPL-MCNC: 6.8 G/DL (ref 6.4–8.2)
RBC # BLD AUTO: 3.91 X10(6)UL
SODIUM SERPL-SCNC: 140 MMOL/L (ref 136–145)
TIBC SERPL-MCNC: 404 UG/DL (ref 240–450)
TRANSFERRIN SERPL-MCNC: 271 MG/DL (ref 200–360)
TRIGL SERPL-MCNC: 29 MG/DL (ref 30–149)
TSI SER-ACNC: 1.16 MIU/ML (ref 0.36–3.74)
VLDLC SERPL CALC-MCNC: 4 MG/DL (ref 0–30)
WBC # BLD AUTO: 6.3 X10(3) UL (ref 4–11)

## 2024-01-30 PROCEDURE — 83550 IRON BINDING TEST: CPT

## 2024-01-30 PROCEDURE — 83540 ASSAY OF IRON: CPT

## 2024-01-30 PROCEDURE — 3074F SYST BP LT 130 MM HG: CPT | Performed by: FAMILY MEDICINE

## 2024-01-30 PROCEDURE — 99395 PREV VISIT EST AGE 18-39: CPT | Performed by: FAMILY MEDICINE

## 2024-01-30 PROCEDURE — 36415 COLL VENOUS BLD VENIPUNCTURE: CPT

## 2024-01-30 PROCEDURE — 82728 ASSAY OF FERRITIN: CPT

## 2024-01-30 PROCEDURE — 80061 LIPID PANEL: CPT

## 2024-01-30 PROCEDURE — 80053 COMPREHEN METABOLIC PANEL: CPT

## 2024-01-30 PROCEDURE — 84443 ASSAY THYROID STIM HORMONE: CPT

## 2024-01-30 PROCEDURE — 85025 COMPLETE CBC W/AUTO DIFF WBC: CPT

## 2024-01-30 PROCEDURE — 3079F DIAST BP 80-89 MM HG: CPT | Performed by: FAMILY MEDICINE

## 2024-01-30 PROCEDURE — 3008F BODY MASS INDEX DOCD: CPT | Performed by: FAMILY MEDICINE

## 2024-01-30 RX ORDER — DEXTROAMPHETAMINE SACCHARATE, AMPHETAMINE ASPARTATE MONOHYDRATE, DEXTROAMPHETAMINE SULFATE AND AMPHETAMINE SULFATE 3.75; 3.75; 3.75; 3.75 MG/1; MG/1; MG/1; MG/1
15 CAPSULE, EXTENDED RELEASE ORAL EVERY MORNING
Qty: 30 CAPSULE | Refills: 0 | Status: SHIPPED | OUTPATIENT
Start: 2024-01-30 | End: 2024-02-29

## 2024-01-30 RX ORDER — PREDNISONE 10 MG/1
10 TABLET ORAL DAILY
Qty: 5 TABLET | Refills: 0 | Status: SHIPPED | OUTPATIENT
Start: 2024-01-30 | End: 2024-02-04

## 2024-01-30 NOTE — PATIENT INSTRUCTIONS
For chronic nasal pain/illnesses   Start zyrtec at night   Add nasal saline spray (salt water rinse)     Wear thick sole shoe/slipper inside at home   Ice heel nightly, continue rolling heel on ball/cylinder to stretch   Take prednisone once/day for 5 days     Try adderall xr for ADD   - we can increase dose in month if needed, call for refill     Consider counseling   Referral placed - someone should contact you

## 2024-01-30 NOTE — PROGRESS NOTES
Caryn Adele Behnke is a 38 year old female.    CC:    Chief Complaint   Patient presents with    Well Adult     no pap smear       HPI:  Wellness     Exercise: No  Drinks water: Yes  Eat variety of fruits & vegetables, lean meats: No  Issues with sleep: Yes  Regular dental exams: No  Change in size/consistency of stool: No  Change in appearance of mole: No    Gyne Hx  OB History    Para Term  AB Living   1 1 1 0 0 1   SAB IAB Ectopic Multiple Live Births   0 0 0 0 1     Patient's last menstrual period was 2024 (approximate).       CAGE Alcohol Screening       2024    11:15 AM   CAGE Flowsheet   Have you ever felt you should Cut down on your drinking? 0   Have people Annoyed you by criticizing your drinking? 0   Have you ever felt bad or Guilty about your drinking? 0   Have you ever had a drink first thing in the morning to steady your nerves or to get rid of a hangover (Eye opener)? 0   Total Score: Item responses on the CAGE are scored 0 or 1, with a higher score an indication of alcohol 0   Total Score: Item responses on the CAGE are scored 0 or 1, with a higher score an indication of alcohol No Risk        Depression Screening (PHQ-2/PHQ-9): Over the LAST 2 WEEKS   Little interest or pleasure in doing things: Several days (can't really answer)    Feeling down, depressed, or hopeless: Not at all    PHQ-2 SCORE: 1          Rogers Suicide Severity Rating Scale Screener   In what setting is the screener performed?: an office visit  1. Have you wished you were dead or wished you could go to sleep and not wake up? (past 30 days): No  2. Have you actually had any thoughts of killing yourself? (past 30 days): No  6. Have you ever done anything, started to do anything, or prepared to do anything to end your life? (lifetime): No  Score and Suggested Actions - Office Visit: No Risk  Score and Intervention  Score and Suggested Actions - Office Visit: No Risk    Also wants to discuss:     ADD    States she saw psychiatrist who recommended she take antidepressant, but patient feels her mood would be better if she could focus on school - she can't focus, then gets overwhelmed/stressed   She has +family hx of ADHD and other BH problems   She has had issues for many years with focus/and avoided tasks that were detailed   No recent panic attacks     Follow up     Migraines - ok     Heel pain - sharp shooting   Pain comes/goes         Allergies:  Allergies   Allergen Reactions    Ibuprofen HIVES     \"CAPS\"    Lactose OTHER (SEE COMMENTS)     GI      Current Meds:  Current Outpatient Medications on File Prior to Visit   Medication Sig Dispense Refill    Rizatriptan Benzoate 10 MG Oral Tab Take 1 tablet (10 mg total) by mouth as needed for Migraine. Repeat in 2 hr if needed. Max dose 20mg/24hr 10 tablet 0    cyclobenzaprine 5 MG Oral Tab Take 1-2 tablets (5-10 mg total) by mouth 3 (three) times daily as needed for Muscle spasms. Do not take while driving. Do not drink alcohol with use 60 tablet 0    clonazePAM 0.5 MG Oral Tab Take 1 tablet (0.5 mg total) by mouth 2 (two) times daily as needed for Anxiety. 10 tablet 0     No current facility-administered medications on file prior to visit.        History:  Past Medical History:   Diagnosis Date    Lipid screening 1/26/2012    Unspecified vitamin D deficiency       Past Surgical History:   Procedure Laterality Date    APPENDECTOMY  1/1/1996    APPENDECTOMY        Family History   Problem Relation Age of Onset    Other (Bipolar disorder) Father     Other (Depression) Father     Diabetes Mother     Other (Depression) Mother     Other (Hypothyroidism) Mother     Breast Cancer Maternal Grandmother     Breast Cancer Paternal Grandmother 50    Cancer Paternal Grandmother     Heart Disease Paternal Grandmother     Other (Other) Paternal Grandmother     Other (HTN) Paternal Grandmother     Other (Depression) Brother     Schizophrenia Brother     Other (proteus syndrome)  Brother       Family Status   Relation Status    Fa Alive    Mo Alive    MGMA     PGMA     Bro Alive    Bro Alive      Social History     Socioeconomic History    Marital status:    Tobacco Use    Smoking status: Former     Packs/day: 1.00     Years: 18.00     Additional pack years: 0.00     Total pack years: 18.00     Types: Cigarettes     Start date:      Quit date: 2013     Years since quitting: 10.9    Smokeless tobacco: Never    Tobacco comments:     over 1 pk. daily   Vaping Use    Vaping Use: Never used   Substance and Sexual Activity    Alcohol use: Yes     Alcohol/week: 1.0 standard drink of alcohol     Types: 1 Glasses of wine per week     Comment: \"rare\"    Drug use: No    Sexual activity: Yes     Partners: Male     Birth control/protection: OCP   Other Topics Concern    Caffeine Concern Yes    Exercise Yes     Comment: \"occasionally\"    Seat Belt Yes            Immunization History   Administered Date(s) Administered    Covid-19 Vaccine Pfizer 30 mcg/0.3 ml 2021, 2021    TDAP 10/29/2010, 2019       Wt Readings from Last 6 Encounters:   24 178 lb (80.7 kg)   23 165 lb (74.8 kg)   23 165 lb (74.8 kg)   23 170 lb (77.1 kg)   23 171 lb (77.6 kg)   22 167 lb (75.8 kg)       BP Readings from Last 3 Encounters:   24 110/80   23 121/69   23 122/69       REVIEW OF SYSTEMS:   ROS    EXAM:   /80   Pulse 80   Temp 98 °F (36.7 °C)   Resp 16   Ht 5' 3\" (1.6 m)   Wt 178 lb (80.7 kg)   LMP 2024 (Approximate)   SpO2 99%   BMI 31.53 kg/m²   Body mass index is 31.53 kg/m².  Patient's last menstrual period was 2024 (approximate).    Physical Exam           ASSESSMENT/PLAN:      1. Routine physical examination  Labs today     2. Migraine with aura and without status migrainosus, not intractable  Stable, controlled     3. Attention deficit disorder (ADD) without hyperactivity  -  Amphetamine-Dextroamphet ER (ADDERALL XR) 15 MG Oral Capsule SR 24 Hr; Take 1 capsule (15 mg total) by mouth every morning.  Dispense: 30 capsule; Refill: 0  - [facility] Referral - In Network  Patient is presenting with 6 months history of inattention and needs ADD evaluation:    Patient describes decreased concentration, distractibility, restlessness,   Symptoms started before the age of 13 years old.    Patient's inattention and hyperactivity and impulsivity is affecting home, school, work, with friends and relatives, and in most activities.  Symptoms affect day-to-day living.    4. Anxiety and depression  - [facility] Referral - In Network    5. Chronic heel pain, right         Health Maintenance   Topic Date Due    Pap Smear  04/10/2022    COVID-19 Vaccine (3 - 2023-24 season) 09/01/2023    Annual Physical  12/14/2023    Annual Depression Screening  01/01/2024    Influenza Vaccine (1) 01/30/2025 (Originally 10/1/2023)    DTaP,Tdap,and Td Vaccines (3 - Td or Tdap) 09/09/2029    Pneumococcal Vaccine: Birth to 64yrs  Aged Out         Healthy diet and regular exercise discussed     Meds & Refills for this Visit:  Requested Prescriptions     Signed Prescriptions Disp Refills    predniSONE 10 MG Oral Tab 5 tablet 0     Sig: Take 1 tablet (10 mg total) by mouth daily for 5 days.    Amphetamine-Dextroamphet ER (ADDERALL XR) 15 MG Oral Capsule SR 24 Hr 30 capsule 0     Sig: Take 1 capsule (15 mg total) by mouth every morning.         Imaging & Consults:  OP REFERRAL TO [facility]    Return in about 3 months (around 4/30/2024).

## 2024-02-02 ENCOUNTER — OFFICE VISIT (OUTPATIENT)
Dept: NEUROLOGY | Facility: CLINIC | Age: 39
End: 2024-02-02
Payer: COMMERCIAL

## 2024-02-02 VITALS
BODY MASS INDEX: 32 KG/M2 | DIASTOLIC BLOOD PRESSURE: 74 MMHG | SYSTOLIC BLOOD PRESSURE: 118 MMHG | OXYGEN SATURATION: 97 % | WEIGHT: 178.19 LBS | RESPIRATION RATE: 16 BRPM | HEART RATE: 72 BPM

## 2024-02-02 DIAGNOSIS — R42 VERTIGO: ICD-10-CM

## 2024-02-02 DIAGNOSIS — G43.101 MIGRAINE WITH AURA AND WITH STATUS MIGRAINOSUS, NOT INTRACTABLE: Primary | ICD-10-CM

## 2024-02-02 PROCEDURE — 99204 OFFICE O/P NEW MOD 45 MIN: CPT | Performed by: STUDENT IN AN ORGANIZED HEALTH CARE EDUCATION/TRAINING PROGRAM

## 2024-02-02 RX ORDER — RIMEGEPANT SULFATE 75 MG/75MG
75 TABLET, ORALLY DISINTEGRATING ORAL AS NEEDED
Qty: 4 TABLET | Refills: 0 | COMMUNITY
Start: 2024-02-02 | End: 2025-02-01

## 2024-02-02 NOTE — PATIENT INSTRUCTIONS
-For migraines:    -Abortive: Trial nurtec as needed for migraine (if it helps let us know and we will pursue prior authorization for more extended prescription)   -Preventive: Supplements that may reduce migraine headache frequency and/or severity. These include Riboflavin (vitamin B2) 400mg by mouth once daily and Magnesium oxide 600mg by mouth once daily. Lifestyle modifications that may help reduce your headaches. Some of these items include good sleep hygiene, regular meals, and avoiding foods that can precipitate a migraine headache (dark chocolate, red wine, etc.)  -Please keep a headache diary prior to your next visit.  -MRI brain with/without contrast, MRA head/ neck   -Go to the [facility] lab when able for urine pregnancy test prior to mri  -Stretch back    Refill policies:    Allow 2-3 business days for refills; controlled substances may take longer.  Contact your pharmacy at least 5 days prior to running out of medication and have them send an electronic request or submit request through the “request refill” option in your [employer] account.  Refills are not addressed on weekends; covering physicians do not authorize routine medications on weekends.  No narcotics or controlled substances are refilled after noon on Fridays or by on call physicians.  By law, narcotics must be electronically prescribed.  A 30 day supply with no refills is the maximum allowed.  If your prescription is due for a refill, you may be due for a follow up appointment.  To best provide you care, patients receiving routine medications need to be seen at least once a year.  Patients receiving narcotic/controlled substance medications need to be seen at least once every 3 months.  In the event that your preferred pharmacy does not have the requested medication in stock (e.g. Backordered), it is your responsibility to find another pharmacy that has the requested medication available.  We will gladly send a new prescription to that pharmacy  at your request.    Scheduling Tests:    If your physician has ordered radiology tests such as MRI or CT scans, please contact Central Scheduling at [phone] right away to schedule the test.  Once scheduled, the [facility] Centralized Referral Team will work with your insurance carrier to obtain pre-certification or prior authorization.  Depending on your insurance carrier, approval may take 3-10 days.  It is highly recommended patients assure they have received an authorization before having a test performed.  If test is done without insurance authorization, patient may be responsible for the entire amount billed.      Precertification and Prior Authorizations:  If your physician has recommended that you have a procedure or additional testing performed the [facility] Centralized Referral Team will contact your insurance carrier to obtain pre-certification or prior authorization.    You are strongly encouraged to contact your insurance carrier to verify that your procedure/test has been approved and is a COVERED benefit.  Although the [facility] Centralized Referral Team does its due diligence, the insurance carrier gives the disclaimer that \"Although the procedure is authorized, this does not guarantee payment.\"    Ultimately the patient is responsible for payment.   Thank you for your understanding in this matter.  Paperwork Completion:  If you require FMLA or disability paperwork for your recovery, please make sure to either drop it off or have it faxed to our office at [phone]. Be sure the form has your name and date of birth on it.  The form will be faxed to our Forms Department and they will complete it for you.  There is a 25$ fee for all forms that need to be filled out.  Please be aware there is a 10-14 day turnaround time.  You will need to sign a release of information (MAGALY) form if your paperwork does not come with one.  You may call the Forms Department with any questions at [phone].  Their fax number is  [phone].

## 2024-02-02 NOTE — H&P
Neurology New Office Visit    Caryn Adele Behnke   Date of Birth 8/7/1985    Subjective:  Caryn Adele Behnke is a(n) 38 year old female with a medical history of migraine with aura, anxiety, ADD who presents for migraine with aura.     Migraine history as follows.     Brief headache history:   -Onset: started sometime around 2020  -Provokers at onset: no clear trigger, no preceding head trauma  -Character: sharp- feels as though knife is in head  -Laterality: sometimes left  -Associated symptoms: photophobia phonophobia, nausea. She reports she's had a visual aura which precedes it a couple of times. No vomiting. Some dizziness when standing up. No focal weakness, numbness, double vision. No   -Frequency: having estimated 6 days per month, getting more frequent  -Timing: Headaches can last for days.   -Triggers: if she sleeps too much she gets a headache, dehydaration/mowing the lawn  -Palliative measures: laying down in dark room, putting ice on head  -Past meds   -Abortive: imitrex- didn't help, Advil- helps but gets hives, tylenol doesn't help    -Preventive: no  -Current  medications:    -Abortive: rizatriptan, doesn't help more severe ones as much   -Preventive: no  -Debility: have been getting worse, has had to go to the hospital for them   -FMHx: mother with migraines    She reports getting vertigo (separate from headaches). She reports the vertigo feels as though the ground is moving. No clear positional component. Lasts for brief spell and sits for a few minutes and then ok. No clear trigger- no trauma, med changes, illness. Some locatoins will trigger, e.g. airport, not houses.     She reports focal area of tingling/numbness in middle of spine. No clear impetus for onset. No other associated symptoms (no radiating pain, weakness, bowel or bladder issues).     Problem List:  Patient Active Problem List   Diagnosis    Fibrocystic changes of right breast       PMHx:  Past Medical History:   Diagnosis Date     Lipid screening 1/26/2012    Unspecified vitamin D deficiency        PSHx:  Past Surgical History:   Procedure Laterality Date    APPENDECTOMY  1/1/1996    APPENDECTOMY         SocHx:  Social History     Socioeconomic History    Marital status:    Tobacco Use    Smoking status: Former     Packs/day: 1.00     Years: 18.00     Additional pack years: 0.00     Total pack years: 18.00     Types: Cigarettes     Start date: 1997     Quit date: 2/27/2013     Years since quitting: 10.9    Smokeless tobacco: Never    Tobacco comments:     over 1 pk. daily   Vaping Use    Vaping Use: Never used   Substance and Sexual Activity    Alcohol use: Yes     Alcohol/week: 1.0 standard drink of alcohol     Types: 1 Glasses of wine per week     Comment: \"rare\"    Drug use: No    Sexual activity: Yes     Partners: Male     Birth control/protection: OCP   Other Topics Concern    Caffeine Concern Yes     Comment: soda    Exercise Yes     Comment: \"occasionally\"    Seat Belt Yes   . Alcohol infrequently, former tobacco, no recreational drug use. Recently started school, software development, also working full time. Has a 17 year old daughter.     Family History:  Family History   Problem Relation Age of Onset    Other (Bipolar disorder) Father     Other (Depression) Father     Diabetes Mother     Other (Depression) Mother     Other (Hypothyroidism) Mother     Breast Cancer Maternal Grandmother     Breast Cancer Paternal Grandmother 50    Cancer Paternal Grandmother     Heart Disease Paternal Grandmother     Other (Other) Paternal Grandmother     Other (HTN) Paternal Grandmother     Other (Depression) Brother     Schizophrenia Brother     Other (proteus syndrome) Brother        Current Medications:  Current Outpatient Medications   Medication Sig Dispense Refill    Rimegepant Sulfate (NURTEC) 75 MG Oral Tablet Dispersible Take 75 mg by mouth as needed. Take one tablet at onset of migraine.  Maximum dose in 24 hours  is 1 tablet (75mg). 4 tablet 0    predniSONE 10 MG Oral Tab Take 1 tablet (10 mg total) by mouth daily for 5 days. 5 tablet 0    Amphetamine-Dextroamphet ER (ADDERALL XR) 15 MG Oral Capsule SR 24 Hr Take 1 capsule (15 mg total) by mouth every morning. 30 capsule 0    cyclobenzaprine 5 MG Oral Tab Take 1-2 tablets (5-10 mg total) by mouth 3 (three) times daily as needed for Muscle spasms. Do not take while driving. Do not drink alcohol with use 60 tablet 0    Rizatriptan Benzoate 10 MG Oral Tab Take 1 tablet (10 mg total) by mouth as needed for Migraine. Repeat in 2 hr if needed. Max dose 20mg/24hr 10 tablet 0    clonazePAM 0.5 MG Oral Tab Take 1 tablet (0.5 mg total) by mouth 2 (two) times daily as needed for Anxiety. (Patient not taking: Reported on 2/2/2024) 10 tablet 0       ROS:  Gained ~7 lbs over past month in setting of being in school on top of working full time.     Objective/Physical Exam:    Vital Signs:  Blood pressure 118/74, pulse 72, resp. rate 16, weight 178 lb 3.2 oz (80.8 kg), last menstrual period 01/16/2024, SpO2 97%, not currently breastfeeding.  General: Alert, good recall of personal medical history  No pain to palpation paraspinal thoracic region .  Respiratory: Non labored breathing on room air    Cardiovascular: Regular rate    Mental status: Alert, oriented, language fluent, comprehension intact    Cranial nerves: Optic disc margins sharp VF full to confrontation bilaterally. Pupils equal, round, equally reactive to light and accommodation. Extraocular eye movements intact without nystagmus. Face sensation intact to light touch. Face strength symmetric and intact. No dysarthria.    Motor:   Power:   -Right upper extremity: shoulder abductors 5, elbow extensors 5, elbow flexors 5, wrist extensors 5, finger extension 5, finger flexion 5, finger abduction 5  -Left upper extremity: shoulder abductors 5, elbow extensors 5, elbow flexors 5, wrist extensors 5, finger extension 5, finger flexion  5, finger abduction 5  -Right lower extremity: hip flexion 5, knee extension 5, dorsiflexion 5  -Left lower extremity: hip flexion 5, knee extension 5, dorsiflexion 5  Tone: Normal   Bulk: Normal  Abnormal movements: None    Sensory: Intact to light touch in distal extremities. Vibratino intact distally.    Coordination: Finger to nose and heel to shin intact.    Reflexes: Right/Left: Biceps 2/2, triceps 2/2, brachioradialis 2/2, hoffmans negative. Patella 2/2, achilles 2/2, plantars downgoing.    Gait: Narrow based, symmetric arm swing, no gait assist. Neg rhomberg.       Labs:       Imaging:  MR T spine 10/2021  FINDINGS:    CORD:  Normal caliber, contour, and signal.  The conus terminates at a normal level.    BONY STRUCTURES:  Normal alignment without significant spondylosis or scoliosis.    DISCS:  No significant disc/facet abnormality, spinal stenosis, or foraminal narrowing.  PARASPINAL:  Normal with no visible mass.                     Impression   CONCLUSION:  Unremarkable MRI of the thoracic spine       Assessment:  Caryn Adele Behnke is a(n) 38 year old female with a medical history of migraine with aura, anxiety, ADD who presents for migraine with rare aura. Has been getting headaches with migrainous features for several years, getting more frequent and severe for no clear reason. Also reports intermittent vertigo without clear trigger, and very localized midline small area of numbness/tingling in mid back. Neurologic exam unremarkable. T spine MRI 2021 unrevealing. For migraines, will trial nurtec as abortive and further preventive as below. Will image for secondary causes. Etiology of localized back sensory change unclear; given lack of associated symptoms, reassuring exam, and reassurign imaging several years ago will monitor clinically. Discussed red flag findings (new bowel/bladder/motor/sensory/radiating symptoms), for which would consider further testing/she should let us know if she develops. In  the meantime, she will aim to stretch back (declined PT as knows stretches from PT in past).     Plan:  -For migraines:    -Abortive: Trial nurtec as needed for migraine (if it helps let us know and we will pursue prior authorization for more extended prescription)   -Preventive: Supplements that may reduce migraine headache frequency and/or severity. These include Riboflavin (vitamin B2) 400mg by mouth once daily and Magnesium oxide 600mg by mouth once daily. Lifestyle modifications that may help reduce your headaches. Some of these items include good sleep hygiene, regular meals, and avoiding foods that can precipitate a migraine headache (dark chocolate, red wine, etc.)  -Please keep a headache diary prior to your next visit.  -MRI brain with/without contrast, MRA head/ neck  -Stretch back    1. Migraine with aura and with status migrainosus, not intractable  - z [facility] MRI BRAIN (W+WO) (CPT=70553); Future  - z [facility] MRA BRAIN (CPT=70544); Future  - z [facility] MRA CAROTIDS (W+WO) (CPT=70549); Future  - Pregnancy Test, Urine; Future  - z [facility] MRI BRAIN (W+WO) (CPT=70553)  - z [facility] MRA BRAIN (CPT=70544)  - z [facility] MRA CAROTIDS (W+WO) (CPT=70549)  - Rimegepant Sulfate (NURTEC) 75 MG Oral Tablet Dispersible; Take 75 mg by mouth as needed. Take one tablet at onset of migraine.  Maximum dose in 24 hours is 1 tablet (75mg).  Dispense: 4 tablet; Refill: 0    2. Vertigo  - z [facility] MRI BRAIN (W+WO) (CPT=70553); Future  - z [facility] MRA BRAIN (CPT=70544); Future  - z [facility] MRA CAROTIDS (W+WO) (CPT=70549); Future  - z [facility] MRI BRAIN (W+WO) (CPT=70553)  - z [facility] MRA BRAIN (CPT=70544)  - z [facility] MRA CAROTIDS (W+WO) (CPT=70549)    Total time 47 minutes including chart review, eliciting history, physical exam, and counseling.    Nishi Weems, DO

## 2024-02-02 NOTE — PROGRESS NOTES
Patient states multiple migraines a month. Patient states long history of migraines. Patient states migraines are mostly on the Anya side.

## 2024-02-21 ENCOUNTER — TELEPHONE (OUTPATIENT)
Dept: FAMILY MEDICINE CLINIC | Facility: CLINIC | Age: 39
End: 2024-02-21

## 2024-02-21 NOTE — TELEPHONE ENCOUNTER
Pt called stating she feels like she might have food poisoning - she's overheated, nauseous and have a headache X2 days.    She said she's nervous because she hasn't felt like this before. Please advice

## 2024-02-23 NOTE — TELEPHONE ENCOUNTER
Reached out to patient to see how patient is doing.   Patient states that she ate spinach dip on Tuesday that was not refrigerated over night.   Started to experience nausea and headache on Tuesday.   Still had nausea and headache on Wednesday, but also felt feverish. (Did not have a thermometer to check temp.)   As of today, patient states that she still feels nauseous but does not have a headache and does not feel \"feverish.\"   Has a hx of migraines, but states that headache on Tuesday and Wednesday did not feel like a migraine.   Denies vomiting or diarrhea.   States that symptoms have improved.   Advised on hydration, rest, and bland diet. Advised to F/U with office if symptoms continue. Patient verbalized understanding and agreed.

## 2024-02-28 ENCOUNTER — PATIENT MESSAGE (OUTPATIENT)
Dept: FAMILY MEDICINE CLINIC | Facility: CLINIC | Age: 39
End: 2024-02-28

## 2024-02-28 DIAGNOSIS — F98.8 ATTENTION DEFICIT DISORDER (ADD) WITHOUT HYPERACTIVITY: ICD-10-CM

## 2024-02-28 RX ORDER — DEXTROAMPHETAMINE SACCHARATE, AMPHETAMINE ASPARTATE MONOHYDRATE, DEXTROAMPHETAMINE SULFATE AND AMPHETAMINE SULFATE 3.75; 3.75; 3.75; 3.75 MG/1; MG/1; MG/1; MG/1
15 CAPSULE, EXTENDED RELEASE ORAL EVERY MORNING
Qty: 30 CAPSULE | Refills: 0 | Status: SHIPPED | OUTPATIENT
Start: 2024-02-28 | End: 2024-03-29

## 2024-02-28 NOTE — TELEPHONE ENCOUNTER
From: Caryn Adele Behnke  To: KOBY KRUSE  Sent: 2/28/2024 2:57 PM CST  Subject: Script    Hello, can you please give Ashley Yudyonal my prescription since the electronic one does not work? Thank you

## 2024-02-28 NOTE — TELEPHONE ENCOUNTER
Caryn Adele Behnke requesting Medication Refill for:  Amphetamine-Dextroamphet ER (ADDERALL XR) 15 MG Oral Capsule SR 24 Hr  (copy and paste medication information)    LOV: 1/30/2024   Last Refill date: 1/30/24  Pharmacy:  paper copy of prescription  Next Scheduled appointment:

## 2024-02-28 NOTE — TELEPHONE ENCOUNTER
Called and lvm asked patient to return phone call or send a [employer] message. Rebecca PSR wanted to  script for patient. Just need documentation that this is okay. HIPAA states Rebecca is listed, sensitive health information.

## 2024-03-02 DIAGNOSIS — G43.109 MIGRAINE WITH AURA AND WITHOUT STATUS MIGRAINOSUS, NOT INTRACTABLE: ICD-10-CM

## 2024-03-02 DIAGNOSIS — F98.8 ATTENTION DEFICIT DISORDER (ADD) WITHOUT HYPERACTIVITY: ICD-10-CM

## 2024-03-02 RX ORDER — RIZATRIPTAN BENZOATE 10 MG/1
TABLET ORAL
Qty: 10 TABLET | Refills: 0 | Status: SHIPPED | OUTPATIENT
Start: 2024-03-02 | End: 2024-04-05

## 2024-03-02 RX ORDER — DEXTROAMPHETAMINE SACCHARATE, AMPHETAMINE ASPARTATE MONOHYDRATE, DEXTROAMPHETAMINE SULFATE AND AMPHETAMINE SULFATE 3.75; 3.75; 3.75; 3.75 MG/1; MG/1; MG/1; MG/1
15 CAPSULE, EXTENDED RELEASE ORAL EVERY MORNING
Qty: 30 CAPSULE | Refills: 0 | OUTPATIENT
Start: 2024-03-02

## 2024-04-02 ENCOUNTER — OFFICE VISIT (OUTPATIENT)
Dept: FAMILY MEDICINE CLINIC | Facility: CLINIC | Age: 39
End: 2024-04-02
Payer: COMMERCIAL

## 2024-04-02 VITALS
HEART RATE: 90 BPM | BODY MASS INDEX: 31.54 KG/M2 | RESPIRATION RATE: 16 BRPM | OXYGEN SATURATION: 100 % | HEIGHT: 63 IN | WEIGHT: 178 LBS | DIASTOLIC BLOOD PRESSURE: 75 MMHG | SYSTOLIC BLOOD PRESSURE: 124 MMHG | TEMPERATURE: 98 F

## 2024-04-02 DIAGNOSIS — J06.9 UPPER RESPIRATORY TRACT INFECTION, UNSPECIFIED TYPE: Primary | ICD-10-CM

## 2024-04-02 LAB
CONTROL LINE PRESENT WITH A CLEAR BACKGROUND (YES/NO): YES YES/NO
KIT LOT #: NORMAL NUMERIC
OPERATOR ID: NORMAL
POCT LOT NUMBER: NORMAL
RAPID SARS-COV-2 BY PCR: NOT DETECTED

## 2024-04-02 PROCEDURE — U0002 COVID-19 LAB TEST NON-CDC: HCPCS | Performed by: PHYSICIAN ASSISTANT

## 2024-04-02 PROCEDURE — 87880 STREP A ASSAY W/OPTIC: CPT | Performed by: PHYSICIAN ASSISTANT

## 2024-04-02 PROCEDURE — 99213 OFFICE O/P EST LOW 20 MIN: CPT | Performed by: PHYSICIAN ASSISTANT

## 2024-04-02 NOTE — PROGRESS NOTES
CHIEF COMPLAINT:     Chief Complaint   Patient presents with    Sore Throat     Cough, nasal congestion for 3 days.  Otc meds taken.         HPI:   Caryn Adele Behnke is a 38 year old female who presents with complaints of feeling sick for 3-4 days.  Symptoms include nasal congestion, nasal drainage, ear pressure, sore throat, and cough.  The patient denies fever, CP, SOB, wheezing, abdominal pain, vomiting, diarrhea, and rash.  The patient is tolerating po.     Current Outpatient Medications   Medication Sig Dispense Refill    Amphetamine-Dextroamphet ER (ADDERALL XR) 20 MG Oral Capsule SR 24 Hr Take 1 capsule (20 mg total) by mouth daily. 30 capsule 0    [START ON 5/2/2024] Amphetamine-Dextroamphet ER (ADDERALL XR) 20 MG Oral Capsule SR 24 Hr Take 1 capsule (20 mg total) by mouth daily. 30 capsule 0    [START ON 6/2/2024] Amphetamine-Dextroamphet ER (ADDERALL XR) 20 MG Oral Capsule SR 24 Hr Take 1 capsule (20 mg total) by mouth daily. 30 capsule 0    RIZATRIPTAN BENZOATE 10 MG Oral Tab TAKE ONE TABLET BY MOUTH ONE TIME DAILY AS NEEDED FOR MIGRAINE. MAY REPEAT IN 2 HOURS IF NEEDED. MAXOF 2 TABLETS IN 24 HOURS 10 tablet 0    Rimegepant Sulfate (NURTEC) 75 MG Oral Tablet Dispersible Take 75 mg by mouth as needed. Take one tablet at onset of migraine.  Maximum dose in 24 hours is 1 tablet (75mg). 4 tablet 0    cyclobenzaprine 5 MG Oral Tab Take 1-2 tablets (5-10 mg total) by mouth 3 (three) times daily as needed for Muscle spasms. Do not take while driving. Do not drink alcohol with use 60 tablet 0      Past Medical History:   Diagnosis Date    Lipid screening 1/26/2012    Unspecified vitamin D deficiency       Social History:  Social History     Socioeconomic History    Marital status:    Tobacco Use    Smoking status: Former     Packs/day: 1.00     Years: 18.00     Additional pack years: 0.00     Total pack years: 18.00     Types: Cigarettes     Start date: 1997     Quit date: 2/27/2013     Years since  quittin.1    Smokeless tobacco: Never    Tobacco comments:     over 1 pk. daily   Vaping Use    Vaping Use: Never used   Substance and Sexual Activity    Alcohol use: Yes     Alcohol/week: 1.0 standard drink of alcohol     Types: 1 Glasses of wine per week     Comment: \"rare\"    Drug use: No    Sexual activity: Yes     Partners: Male     Birth control/protection: OCP   Other Topics Concern    Caffeine Concern Yes     Comment: soda    Exercise Yes     Comment: \"occasionally\"    Seat Belt Yes        REVIEW OF SYSTEMS:   GENERAL: See HPI  SKIN: Denies rashes, skin wounds or ulcers.  EYES: Denies blurred vision or double vision  HENT: See HPI  CHEST: Denies chest pain, or palpitations  LUNGS: See HPI  GI: Denies abdominal pain, N/V/C/D.   MUSCULOSKELETAL: no arthralgia or swollen joints  LYMPH:  Denies lymphadenopathy  NEURO: Denies headaches or lightheadedness      EXAM:   /75   Pulse 90   Temp 98 °F (36.7 °C) (Oral)   Resp 16   Ht 5' 3\" (1.6 m)   Wt 178 lb (80.7 kg)   LMP 2024 (Exact Date)   SpO2 100%   BMI 31.53 kg/m²   GENERAL: well developed, well nourished,in no apparent distress, cooperative   SKIN: no rashes, nosuspicious lesions, no abnormal pigmentation  HEAD: atraumatic, normocephalic  EYES: EOM intact, PERRL.  Conjunctiva normal.  Cornea clear.  Lid margins normal.  No active drainage.  EARS: Right TM normal, + bulging, no retraction, + fluid, bony landmarks normal.  Left TM normal, + bulging, no retraction, + fluid, bony landmarks normal.    NOSE: nostrils patent, +copious discharge, nasal mucosa pink and not inflamed.  No sinus tenderness.  THROAT: oral mucosa pink, moist and intact. No visible dental caries. Posterior pharynx without erythema or exudates.  NECK: supple, non-tender.  LUNGS: clear to auscultation bilaterally, no wheezes or rhonchi. Breathing is non labored.  CARDIO: RRR without murmur  GI: No visible scars, or masses. BS's present x4. No palpable masses or  hepatosplenomegaly.  Non tender.  No guarding or rebound tenderness  EXTREMITIES: no cyanosis, clubbing or edema.  Homans NEG.  Dorsalis Pedis 2+.  LYMPH:  No lymphadenopathy.    NEURO: A&Ox3.  CN II-XII intact.  No focal deficits.  Coordination and Gait normal.  Kernig and Brudzinski's are negative.    Rapid Strep is NEGATIVE  Rapid COVID is NEGATIVE      ASSESSMENT AND PLAN:     ASSESSMENT:  Encounter Diagnosis   Name Primary?    Upper respiratory tract infection, unspecified type Yes       PLAN:    Patient Instructions   Flonase  Antihistamine  Follow up with PCP  If worse seek treatment

## 2024-04-05 DIAGNOSIS — G43.109 MIGRAINE WITH AURA AND WITHOUT STATUS MIGRAINOSUS, NOT INTRACTABLE: ICD-10-CM

## 2024-04-05 RX ORDER — RIZATRIPTAN BENZOATE 10 MG/1
TABLET ORAL
Qty: 10 TABLET | Refills: 2 | Status: SHIPPED | OUTPATIENT
Start: 2024-04-05

## 2024-04-05 NOTE — TELEPHONE ENCOUNTER
Caryn Adele Behnke requesting Medication Refill for:  RIZATRIPTAN BENZOATE 10 MG Oral Tab   LOV: 1/30/2024   Last Refill date: 3/2/24  Pharmacy: [id number]

## 2024-04-12 ENCOUNTER — HOSPITAL ENCOUNTER (OUTPATIENT)
Age: 39
Discharge: HOME | End: 2024-04-12
Payer: COMMERCIAL

## 2024-04-12 VITALS
SYSTOLIC BLOOD PRESSURE: 118 MMHG | BODY MASS INDEX: 30.12 KG/M2 | RESPIRATION RATE: 16 BRPM | TEMPERATURE: 98 F | WEIGHT: 170 LBS | HEART RATE: 70 BPM | DIASTOLIC BLOOD PRESSURE: 77 MMHG | HEIGHT: 63 IN | OXYGEN SATURATION: 100 %

## 2024-04-12 DIAGNOSIS — G43.909 MIGRAINE WITHOUT STATUS MIGRAINOSUS, NOT INTRACTABLE, UNSPECIFIED MIGRAINE TYPE: Primary | ICD-10-CM

## 2024-04-12 PROCEDURE — 99215 OFFICE O/P EST HI 40 MIN: CPT

## 2024-04-12 PROCEDURE — 96374 THER/PROPH/DIAG INJ IV PUSH: CPT | Performed by: PHYSICIAN ASSISTANT

## 2024-04-12 PROCEDURE — 99215 OFFICE O/P EST HI 40 MIN: CPT | Performed by: PHYSICIAN ASSISTANT

## 2024-04-12 PROCEDURE — 96361 HYDRATE IV INFUSION ADD-ON: CPT

## 2024-04-12 PROCEDURE — 96375 TX/PRO/DX INJ NEW DRUG ADDON: CPT

## 2024-04-12 PROCEDURE — 96374 THER/PROPH/DIAG INJ IV PUSH: CPT

## 2024-04-12 PROCEDURE — 96361 HYDRATE IV INFUSION ADD-ON: CPT | Performed by: PHYSICIAN ASSISTANT

## 2024-04-12 PROCEDURE — 99214 OFFICE O/P EST MOD 30 MIN: CPT

## 2024-04-12 PROCEDURE — 96375 TX/PRO/DX INJ NEW DRUG ADDON: CPT | Performed by: PHYSICIAN ASSISTANT

## 2024-04-12 RX ORDER — DEXAMETHASONE SODIUM PHOSPHATE 10 MG/ML
10 INJECTION, SOLUTION INTRAMUSCULAR; INTRAVENOUS ONCE
Status: COMPLETED | OUTPATIENT
Start: 2024-04-12 | End: 2024-04-12

## 2024-04-12 RX ORDER — SODIUM CHLORIDE 9 MG/ML
1000 INJECTION, SOLUTION INTRAVENOUS ONCE
Status: COMPLETED | OUTPATIENT
Start: 2024-04-12 | End: 2024-04-12

## 2024-04-12 RX ORDER — ONDANSETRON 2 MG/ML
4 INJECTION INTRAMUSCULAR; INTRAVENOUS ONCE
Status: COMPLETED | OUTPATIENT
Start: 2024-04-12 | End: 2024-04-12

## 2024-04-12 NOTE — ED INITIAL ASSESSMENT (HPI)
Pain is on left side of head, pt states it feels like she's being stabbed right through her eyeball

## 2024-04-12 NOTE — ED PROVIDER NOTES
Patient Seen in: [facility]      History     Chief Complaint   Patient presents with    Headache     Stated Complaint: Headache    Subjective:   HPI    39 YO female with reported history of migraines presents to immediate care for left side migraine relief. Patient states she woke up with left side migraine. Associated N/V, photophobia and phonophobia. Took 2 Rizatriptan this morning without improvement.  She states migraines are always on the left side and typically come when on her menstrual cycle, which she just completed a few days ago. She denies change in migraine location or intensity with this headache when compared to prior.         Objective:   Past Medical History:    Lipid screening    Unspecified vitamin D deficiency              Past Surgical History:   Procedure Laterality Date    Appendectomy  1996    Appendectomy                  Social History     Socioeconomic History    Marital status:    Tobacco Use    Smoking status: Former     Current packs/day: 0.00     Average packs/day: 1 pack/day for 18.0 years (18.0 ttl pk-yrs)     Types: Cigarettes     Start date:      Quit date: 2013     Years since quittin.1    Smokeless tobacco: Never    Tobacco comments:     over 1 pk. daily   Vaping Use    Vaping status: Never Used   Substance and Sexual Activity    Alcohol use: Yes     Alcohol/week: 1.0 standard drink of alcohol     Types: 1 Glasses of wine per week     Comment: \"rare\"    Drug use: No    Sexual activity: Yes     Partners: Male     Birth control/protection: OCP   Other Topics Concern    Caffeine Concern Yes     Comment: soda    Exercise Yes     Comment: \"occasionally\"    Seat Belt Yes              Review of Systems    Positive for stated complaint: Headache  Other systems are as noted in HPI.  Constitutional and vital signs reviewed.      All other systems reviewed and negative except as noted above.    Physical Exam     ED Triage Vitals [24 1229]    /79   Pulse 74   Resp 16   Temp 98.4 °F (36.9 °C)   Temp src Temporal   SpO2 98 %   O2 Device None (Room air)       Current:/77   Pulse 70   Temp 98.4 °F (36.9 °C) (Temporal)   Resp 16   Ht 160 cm (5' 3\")   Wt 77.1 kg   LMP 04/08/2024 (Exact Date)   SpO2 100%   BMI 30.11 kg/m²         Physical Exam  Vitals and nursing note reviewed.   Constitutional:       General: She is not in acute distress.     Appearance: Normal appearance. She is not ill-appearing, toxic-appearing or diaphoretic.   Cardiovascular:      Rate and Rhythm: Normal rate and regular rhythm.   Pulmonary:      Effort: Pulmonary effort is normal. No respiratory distress.      Breath sounds: Normal breath sounds.   Neurological:      General: No focal deficit present.      Mental Status: She is alert and oriented to person, place, and time.      GCS: GCS eye subscore is 4. GCS verbal subscore is 5. GCS motor subscore is 6.      Cranial Nerves: No cranial nerve deficit.   Psychiatric:         Mood and Affect: Mood normal.         Behavior: Behavior normal.         ED Course   Labs Reviewed - No data to display      MDM      This is a 39 YO female with reported history of migraines presents to immediate care for left side migraine relief.    Patient is sitting in a dark room and denies any change in this migraine presentation from prior.  Neurological exam is unremarkable.  No focal deficits.  GCS 15.  She has a documented allergy to ibuprofen. Subjective improvement s/p migraine cocktail consisting of Decadron, fluids and Zofran. She feels comfortable going home at this time. ER precautions discussed with patient understanding.      Medical Decision Making  Risk  OTC drugs.        Disposition and Plan     Clinical Impression:  1. Migraine without status migrainosus, not intractable, unspecified migraine type         Disposition:  Discharge  4/12/2024  2:34 pm    Follow-up:  [facility]  [address]  [place] [address]  [phone]    If symptoms worsen          Medications Prescribed:  Discharge Medication List as of 4/12/2024  2:39 PM

## 2024-04-12 NOTE — ED INITIAL ASSESSMENT (HPI)
Pt states she woke up at 6am with headache, has had multiple headaches this week. Hx of migraines

## 2024-04-26 ENCOUNTER — OFFICE VISIT (OUTPATIENT)
Facility: CLINIC | Age: 39
End: 2024-04-26
Payer: COMMERCIAL

## 2024-04-26 VITALS
SYSTOLIC BLOOD PRESSURE: 118 MMHG | WEIGHT: 177 LBS | HEIGHT: 63 IN | BODY MASS INDEX: 31.36 KG/M2 | DIASTOLIC BLOOD PRESSURE: 70 MMHG

## 2024-04-26 DIAGNOSIS — Z01.419 ENCOUNTER FOR WELL WOMAN EXAM WITH ROUTINE GYNECOLOGICAL EXAM: Primary | ICD-10-CM

## 2024-04-26 DIAGNOSIS — Z30.011 ENCOUNTER FOR INITIAL PRESCRIPTION OF CONTRACEPTIVE PILLS: ICD-10-CM

## 2024-04-26 DIAGNOSIS — Z11.3 SCREENING FOR STDS (SEXUALLY TRANSMITTED DISEASES): ICD-10-CM

## 2024-04-26 DIAGNOSIS — Z12.31 SCREENING MAMMOGRAM FOR BREAST CANCER: ICD-10-CM

## 2024-04-26 PROCEDURE — 87624 HPV HI-RISK TYP POOLED RSLT: CPT | Performed by: OBSTETRICS & GYNECOLOGY

## 2024-04-26 PROCEDURE — 87491 CHLMYD TRACH DNA AMP PROBE: CPT | Performed by: OBSTETRICS & GYNECOLOGY

## 2024-04-26 PROCEDURE — 87591 N.GONORRHOEAE DNA AMP PROB: CPT | Performed by: OBSTETRICS & GYNECOLOGY

## 2024-04-26 PROCEDURE — 99385 PREV VISIT NEW AGE 18-39: CPT | Performed by: OBSTETRICS & GYNECOLOGY

## 2024-04-26 PROCEDURE — 88175 CYTOPATH C/V AUTO FLUID REDO: CPT | Performed by: OBSTETRICS & GYNECOLOGY

## 2024-04-26 RX ORDER — NORETHINDRONE ACETATE AND ETHINYL ESTRADIOL 1.5-30(21)
1 KIT ORAL DAILY
Qty: 84 TABLET | Refills: 1 | Status: SHIPPED | OUTPATIENT
Start: 2024-04-26

## 2024-04-26 NOTE — PROGRESS NOTES
GYN H&P     Genetic questionnaire reviewed with the patient and she will be referred for genetic counseling if the questionnaire had any positive results.    The [facility] intake form was also reviewed regarding contraception, menstrual periods, urinary health, and vaginal / sexual health    2024  10:47 AM    Chief Complaint   Patient presents with    Physical     Patient is here for annual. Wants to discuss severe mood changes after menstrual cycle and is looking to get STD testing.       HPI: Dona is a 38 year old  Patient's last menstrual period was 2024 (exact date).  (contraception: starting a new relationship) here for her annual gyn exam.     She has mood issues around menses.   Menses are regular. Denies any pelvic or breast complaints.   Mood issues are mid-cycle only - discussed this unusual timing.  OCP's might help    Discussed all other LARC's     Previous encounters and chart reviewed.     OB History    Para Term  AB Living   1 1 1     1   SAB IAB Ectopic Multiple Live Births           1      # Outcome Date GA Lbr Luis/2nd Weight Sex Type Anes PTL Lv   1 Term 06 40w0d  6 lb 15 oz (3.147 kg) F NORMAL SPONT   MARIMAR       GYN hx:   Menarche: 15  Period Cycle (Days): 28-30  Period Duration (Days): 3-5 days  Period Flow: heavy to light  Use of Birth Control (if yes, specify type): Abstinence  Hx Prior Abnormal Pap: Yes  Pap Date: 04/10/19  Pap Result Notes: 04/10/2019 neg/neg       (2016 Neg/Neg, 2014 Neg, 2008 neg, 2007 neg, 2006 neg)  Follow Up Recommendation: due today      Past Medical History:    Lipid screening    Unspecified vitamin D deficiency     Past Surgical History:   Procedure Laterality Date    Appendectomy  1996    Appendectomy       Allergies   Allergen Reactions    Ibuprofen HIVES     \"CAPS\"    Lactose OTHER (SEE COMMENTS)     GI     Current Outpatient Medications on File Prior to Visit   Medication Sig Dispense Refill     Rizatriptan Benzoate 10 MG Oral Tab TAKE ONE TABLET BY MOUTH ONE TIME DAILY AS NEEDED FOR MIGRAINE. MAY REPEAT IN 2 HOURS IF NEEDED. MAXOF 2 TABLETS IN 24 HOURS 10 tablet 2    Amphetamine-Dextroamphet ER (ADDERALL XR) 20 MG Oral Capsule SR 24 Hr Take 1 capsule (20 mg total) by mouth daily. 30 capsule 0    [START ON 5/2/2024] Amphetamine-Dextroamphet ER (ADDERALL XR) 20 MG Oral Capsule SR 24 Hr Take 1 capsule (20 mg total) by mouth daily. (Patient not taking: Reported on 4/26/2024) 30 capsule 0    [START ON 6/2/2024] Amphetamine-Dextroamphet ER (ADDERALL XR) 20 MG Oral Capsule SR 24 Hr Take 1 capsule (20 mg total) by mouth daily. (Patient not taking: Reported on 4/26/2024) 30 capsule 0    Rimegepant Sulfate (NURTEC) 75 MG Oral Tablet Dispersible Take 75 mg by mouth as needed. Take one tablet at onset of migraine.  Maximum dose in 24 hours is 1 tablet (75mg). (Patient not taking: Reported on 4/26/2024) 4 tablet 0    cyclobenzaprine 5 MG Oral Tab Take 1-2 tablets (5-10 mg total) by mouth 3 (three) times daily as needed for Muscle spasms. Do not take while driving. Do not drink alcohol with use (Patient not taking: Reported on 4/26/2024) 60 tablet 0     No current facility-administered medications on file prior to visit.     Family History   Problem Relation Age of Onset    Other (Bipolar disorder) Father     Other (Depression) Father     Diabetes Mother     Other (Depression) Mother     Other (Hypothyroidism) Mother     Breast Cancer Maternal Grandmother     Breast Cancer Paternal Grandmother 50    Cancer Paternal Grandmother     Heart Disease Paternal Grandmother     Other (Other) Paternal Grandmother     Other (HTN) Paternal Grandmother     Other (Depression) Brother     Schizophrenia Brother     Other (proteus syndrome) Brother      Social History     Socioeconomic History    Marital status:      Spouse name: Not on file    Number of children: Not on file    Years of education: Not on file    Highest  education level: Not on file   Occupational History    Not on file   Tobacco Use    Smoking status: Former     Current packs/day: 0.00     Average packs/day: 1 pack/day for 18.0 years (18.0 ttl pk-yrs)     Types: Cigarettes     Start date:      Quit date: 2013     Years since quittin.1    Smokeless tobacco: Never    Tobacco comments:     over 1 pk. daily   Vaping Use    Vaping status: Never Used   Substance and Sexual Activity    Alcohol use: Yes     Alcohol/week: 1.0 standard drink of alcohol     Types: 1 Glasses of wine per week     Comment: \"rare\"    Drug use: No    Sexual activity: Not Currently     Partners: Male     Birth control/protection: Abstinence   Other Topics Concern     Service Not Asked    Blood Transfusions Not Asked    Caffeine Concern Yes     Comment: soda    Occupational Exposure Not Asked    Hobby Hazards Not Asked    Sleep Concern Not Asked    Stress Concern Not Asked    Weight Concern Not Asked    Special Diet Not Asked    Back Care Not Asked    Exercise Yes     Comment: \"occasionally\"    Bike Helmet Not Asked    Seat Belt Yes    Self-Exams Not Asked   Social History Narrative    Not on file     Social Determinants of Health     Financial Resource Strain: Not on file   Food Insecurity: Not on file   Transportation Needs: Not on file   Physical Activity: Not on file   Stress: Not on file   Social Connections: Not on file   Housing Stability: Not on file       ROS:     Review of Systems:  General: denies fevers, chills, fatigue and malaise.   Eyes: no visual changes, denies headaches  ENT: no complaints, denies earaches, runny nose, epistaxis, throat pain or sore throat  Respiratory: denies SOB, dyspnea, cough or wheezing  Cardiovascular: denies chest pain, palpitations, exercise intolerance   GI: denies abdominal pain, diarrhea, constipation  : no complaints, denies dysuria, increased urinary frequency. Menses regular, no dysmenorrhea, no menorrhagia, no dyspareunia    Hematological/lymphatic: denies history of excessive bleeding or bruising, denies dizziness, lightheadedness.   Breast: denies rashes, skin changes, pain, lumps or discharge   Psychiatric: denies depression, changes in sleep patterns, anxiety  Endocrine: denies hot or cold intolerance, mood changes   Neurological: denies changes in sight, smell, hearing or taste. Denies seizures or tremors  Immunological: denies allergie, denies anaphylaxis, or swollen lymph nodes  Musculoskeletal: denies joint pain, morning stiffness, decreased range of motion         O /70   Ht 63\"   Wt 177 lb (80.3 kg)   LMP 04/08/2024 (Exact Date)   BMI 31.35 kg/m²         Wt Readings from Last 6 Encounters:   04/26/24 177 lb (80.3 kg)   04/12/24 170 lb (77.1 kg)   04/02/24 178 lb (80.7 kg)   02/02/24 178 lb 3.2 oz (80.8 kg)   01/30/24 178 lb (80.7 kg)   12/08/23 165 lb (74.8 kg)     Exam:   GENERAL: well developed, well nourished, in no apparent distress, oriented.  SKIN: no rashes, no suspicious lesions  HEENT: normal  NECK: supple; no thyromegaly, no adenopathy  LUNGS: clear to auscultation  CARDIOVASCULAR: normal S1, S2, RRR  BREASTS: soft, nontender, no palpable masses or nodes, no nipple discharge, no skin changes, no axillary adenopathy  ABDOMEN: umbilical piercing - no scars,  soft, non distended; non tender, no masses  PELVIC: External Genitalia: Normal appearing, no lesions.    Vagina: normal pink mucosa, no lesions, normal clear discharge.    Bladder well supported.  No  anterior or posterior hernias    Cervix: multiparous, no lesions , No CMT     Uterus: AVAF, mobile, non tender, normal size    Adnexa: non tender, no masses, normal size    Rectal: deferred  EXTREMITIES:  non tender without edema, bilateral sleeve tattoos'        A/P: Patient is 38 year old female with no complaints. Here for well woman exam.            Patient counseled on:    Diet/exercise.      Self Breast Exams     Safe sex practices / and living  environment     Vaccines:  Annual Flu, Tdap +/- Gardasil  recommended (up to 45 yrs).      Pneumococcal at 65 yrs old, Shingles at 60 yrs old          Pap: done  GC/Chlamydia:  added to pap  Mammogram:  in 2016 after a breast biopsy   Dexa:  na  Colonoscopy / Cologuard:   na  Lipid / Cholesterol:    Lipid Panel [[id number]] (Abnormal)  Resulted: 01/30/24 1837, Result status: Final result  Ordering provider: Lauren José MD  01/30/24 1151 Resulting lab: [facility] ([facility])     Specimen Information    ID Type Source Collected On   [id number] Blood -- 01/30/24 1151     Components    Component Value Reference Range Flag   Cholesterol, Total 161 <200 mg/dL --   Comment: Desirable  <200 mg/dL  Borderline  200-239 mg/dL  High      >=240 mg/dL     HDL Cholesterol 84 40 - 59 mg/dL H High    Comment:      Interpretive Information:  An HDL cholesterol <40 mg/dL is low and constitutes a coronary heart disease risk factor. An HDL cholesterol >60 mg/dL is a negative risk factor for coronary heart disease.     Triglycerides 29 30 - 149 mg/dL L Low    Comment:      Reference interval for fasting triglycerides  Desirable: <150 mg/dL  Borderline: 150-199 mg/dL  High: 200-499 mg/dL  Very High: >=500 mg/dL       LDL Cholesterol 70 <100 mg/dL --   Comment: Desirable <100 mg/dL  Borderline 100-129 mg/dL  High     >=130mg/dL     VLDL 4 0 - 30 mg/dL --   Non HDL Chol 77 <130 mg/dL --   Comment:      Desirable  <130 mg/dL  Borderline  130-159 mg/dL  High        160-189 mg/dL      Very high >=190 mg/dL            Meds This Visit:    Requested Prescriptions     Signed Prescriptions Disp Refills    Norethin Ace-Eth Estrad-FE (JUNEL FE 1.5/30) 1.5-30 MG-MCG Oral Tab 84 tablet 1     Sig: Take 1 tablet by mouth daily.       1. Encounter for well woman exam with routine gynecological exam  - Hpv High Risk , Thin Prep Collect; Future  - ThinPrep PAP Smear B; Future    2. Screening mammogram for breast cancer  - ALYCE CONCEPCION  2D+3D SCREENING BILAT (CPT=77067/70187); Future    3. Encounter for initial prescription of contraceptive pills  - Norethin Ace-Eth Estrad-FE (JUNEL FE 1.5/30) 1.5-30 MG-MCG Oral Tab; Take 1 tablet by mouth daily.  Dispense: 84 tablet; Refill: 1      Return in about 3 months (around 7/26/2024) for Office Visit.    Brett Mcdermott MD   4/26/2024  10:47 AM

## 2024-04-29 LAB
C TRACH DNA SPEC QL NAA+PROBE: NEGATIVE
HPV I/H RISK 1 DNA SPEC QL NAA+PROBE: NEGATIVE
N GONORRHOEA DNA SPEC QL NAA+PROBE: NEGATIVE

## 2024-05-03 LAB
.: NORMAL
.: NORMAL

## 2024-06-26 ENCOUNTER — OFFICE VISIT (OUTPATIENT)
Dept: FAMILY MEDICINE CLINIC | Facility: CLINIC | Age: 39
End: 2024-06-26

## 2024-06-26 VITALS
SYSTOLIC BLOOD PRESSURE: 122 MMHG | RESPIRATION RATE: 16 BRPM | HEIGHT: 63 IN | HEART RATE: 80 BPM | TEMPERATURE: 98 F | BODY MASS INDEX: 32.78 KG/M2 | OXYGEN SATURATION: 97 % | WEIGHT: 185 LBS | DIASTOLIC BLOOD PRESSURE: 80 MMHG

## 2024-06-26 DIAGNOSIS — J02.9 SORE THROAT: Primary | ICD-10-CM

## 2024-06-26 LAB
CONTROL LINE PRESENT WITH A CLEAR BACKGROUND (YES/NO): YES YES/NO
KIT LOT #: NORMAL NUMERIC
STREP GRP A CUL-SCR: NEGATIVE

## 2024-06-26 PROCEDURE — 99213 OFFICE O/P EST LOW 20 MIN: CPT | Performed by: NURSE PRACTITIONER

## 2024-06-26 PROCEDURE — 87880 STREP A ASSAY W/OPTIC: CPT | Performed by: NURSE PRACTITIONER

## 2024-06-26 NOTE — PROGRESS NOTES
CHIEF COMPLAINT:     Chief Complaint   Patient presents with    Sore Throat     S/s for 2 days.  No OTC meds taken.           HPI:   Caryn Adele Behnke is a 38 year old female presents to clinic with complaint of sore throat. Patient has had 2 days. Symptoms have been worse in the morning since onset.  Patient reports following associated symptoms:  PND . Patient denies headache. Patient denies nausea.  Patient denies rash. Patient reports history of strep throat. Patient denies strep pharyngitis exposure. Treating symptoms with nothing.    Current Outpatient Medications   Medication Sig Dispense Refill    Norethin Ace-Eth Estrad-FE (JUNEL FE 1.5/30) 1.5-30 MG-MCG Oral Tab Take 1 tablet by mouth daily. 84 tablet 1    Rizatriptan Benzoate 10 MG Oral Tab TAKE ONE TABLET BY MOUTH ONE TIME DAILY AS NEEDED FOR MIGRAINE. MAY REPEAT IN 2 HOURS IF NEEDED. MAXOF 2 TABLETS IN 24 HOURS 10 tablet 2    Amphetamine-Dextroamphet ER (ADDERALL XR) 20 MG Oral Capsule SR 24 Hr Take 1 capsule (20 mg total) by mouth daily. (Patient not taking: Reported on 4/26/2024) 30 capsule 0    Rimegepant Sulfate (NURTEC) 75 MG Oral Tablet Dispersible Take 75 mg by mouth as needed. Take one tablet at onset of migraine.  Maximum dose in 24 hours is 1 tablet (75mg). (Patient not taking: Reported on 4/26/2024) 4 tablet 0    cyclobenzaprine 5 MG Oral Tab Take 1-2 tablets (5-10 mg total) by mouth 3 (three) times daily as needed for Muscle spasms. Do not take while driving. Do not drink alcohol with use (Patient not taking: Reported on 4/26/2024) 60 tablet 0      Past Medical History:    Lipid screening    Unspecified vitamin D deficiency      Social History:  Social History     Socioeconomic History    Marital status:    Tobacco Use    Smoking status: Former     Current packs/day: 0.00     Average packs/day: 1 pack/day for 18.0 years (18.0 ttl pk-yrs)     Types: Cigarettes     Start date: 1997     Quit date: 2/27/2013     Years since quitting:  11.3    Smokeless tobacco: Never    Tobacco comments:     over 1 pk. daily   Vaping Use    Vaping status: Never Used   Substance and Sexual Activity    Alcohol use: Yes     Alcohol/week: 1.0 standard drink of alcohol     Types: 1 Glasses of wine per week     Comment: \"rare\"    Drug use: No    Sexual activity: Not Currently     Partners: Male     Birth control/protection: Abstinence   Other Topics Concern    Caffeine Concern Yes     Comment: soda    Exercise Yes     Comment: \"occasionally\"    Seat Belt Yes        REVIEW OF SYSTEMS:   GENERAL HEALTH: no change in appetite  SKIN: Per HPI  HEENT: denies ear pain, See HPI  RESPIRATORY: denies shortness of breath or wheezing  CARDIOVASCULAR: denies chest pain or palpitations   GI: denies vomiting or diarrhea  NEURO: denies dizziness or lightheadedness    EXAM:   /80   Pulse 80   Temp 98.2 °F (36.8 °C) (Oral)   Resp 16   Ht 5' 3\" (1.6 m)   Wt 185 lb (83.9 kg)   LMP 06/25/2024 (Exact Date)   SpO2 97%   BMI 32.77 kg/m²   GENERAL: well developed, well nourished,in no apparent distress  SKIN: no rashes,no suspicious lesions  HEAD: atraumatic, normocephalic  EYES: conjunctiva clear, EOM intact  EARS: TM's clear, non-injected, no bulging, retraction, or fluid bilaterally  NOSE: nostrils patent, clear nasal discharge, nasal mucosa mildly inflamed  THROAT: oral mucosa pink, moist. Posterior pharynx is not erythematous and injected. no exudates. Tonsils 2/4.  Breath is not malodorous. + PND  NECK: supple  LUNGS: clear to auscultation bilaterally, no wheezes or rhonchi. Breathing is non labored.  CARDIO: RRR without murmur  GI: good BS's,no masses, hepatosplenomegaly, or tenderness on direct palpation  EXTREMITIES: no cyanosis, clubbing or edema  LYMPH: no anterior cervical. no submandibular lymphadenopathy.  No posterior cervical or occipital lymphadenopathy.    Recent Results (from the past 24 hour(s))   Rapid Strep    Collection Time: 06/26/24 10:04 AM   Result  Value Ref Range    Strep Grp A Screen Negative Negative    Control Line Present with a clear background (yes/no) Yes Yes/No    Kit Lot # [id number] Numeric    Kit Expiration Date 5/21/2025 Date         ASSESSMENT AND PLAN:   Assessment:   Encounter Diagnosis   Name Primary?    Sore throat Yes       Plan: Discussed that due to symptoms and negative rapid strep this is most likely viral and does not require antibiotics. Will not send throat culture as discussed     Discussed possibility of mononucleosis infection, but at this time symptoms are not reflective of mono infection.  If s/sx persist will consider MonoSpot or further lab work.     Comfort measures explained and discussed as listed in Patient Instructions    Follow up with PCP in 3-5 days if not improving, condition worsens, or fever greater than or equal to 100.4 persists for 72 hours.    The patient/parent indicates understanding of these issues and agrees to the plan.    Patient Instructions   Flonase nasal spray  Benadryl at night

## 2024-07-01 DIAGNOSIS — F98.8 ATTENTION DEFICIT DISORDER (ADD) WITHOUT HYPERACTIVITY: ICD-10-CM

## 2024-07-01 NOTE — TELEPHONE ENCOUNTER
Caryn Adele Behnke requesting Medication Refill for:    Amphetamine-Dextroamphet ER (ADDERALL XR) 20 MG Oral Capsule SR 24 Hr       Preferred Pharmacy: [id number] - [place], [place]     LOV: 1/30/2024   Last Refill date: 06/02/24

## 2024-07-01 NOTE — TELEPHONE ENCOUNTER
Patient requesting refill of adderall  LOV: 1/30/2024   Last Refill date: 06/02/24  Please approve or deny pended RX

## 2024-07-02 RX ORDER — DEXTROAMPHETAMINE SACCHARATE, AMPHETAMINE ASPARTATE MONOHYDRATE, DEXTROAMPHETAMINE SULFATE AND AMPHETAMINE SULFATE 5; 5; 5; 5 MG/1; MG/1; MG/1; MG/1
20 CAPSULE, EXTENDED RELEASE ORAL DAILY
Qty: 30 CAPSULE | Refills: 0 | Status: SHIPPED | OUTPATIENT
Start: 2024-07-02 | End: 2024-08-01

## 2024-07-22 ENCOUNTER — TELEPHONE (OUTPATIENT)
Dept: FAMILY MEDICINE CLINIC | Facility: CLINIC | Age: 39
End: 2024-07-22

## 2024-07-22 DIAGNOSIS — G43.109 MIGRAINE WITH AURA AND WITHOUT STATUS MIGRAINOSUS, NOT INTRACTABLE: ICD-10-CM

## 2024-07-22 NOTE — TELEPHONE ENCOUNTER
Caryn Adele Behnke requesting Medication Refill for:    Rizatriptan Benzoate 10 MG Oral Tab     Preferred Pharmacy: [id number]     LOV: 1/30/2024   Last Refill date: 04/05/24

## 2024-07-25 RX ORDER — RIZATRIPTAN BENZOATE 10 MG/1
TABLET ORAL
Qty: 10 TABLET | Refills: 2 | Status: SHIPPED | OUTPATIENT
Start: 2024-07-25

## 2024-08-03 ENCOUNTER — PATIENT MESSAGE (OUTPATIENT)
Dept: FAMILY MEDICINE CLINIC | Facility: CLINIC | Age: 39
End: 2024-08-03

## 2024-08-03 DIAGNOSIS — F98.8 ATTENTION DEFICIT DISORDER (ADD) WITHOUT HYPERACTIVITY: ICD-10-CM

## 2024-08-05 RX ORDER — DEXTROAMPHETAMINE SACCHARATE, AMPHETAMINE ASPARTATE MONOHYDRATE, DEXTROAMPHETAMINE SULFATE AND AMPHETAMINE SULFATE 5; 5; 5; 5 MG/1; MG/1; MG/1; MG/1
20 CAPSULE, EXTENDED RELEASE ORAL DAILY
Qty: 30 CAPSULE | Refills: 0 | Status: SHIPPED | OUTPATIENT
Start: 2024-08-05 | End: 2024-09-04

## 2024-08-05 NOTE — TELEPHONE ENCOUNTER
Patient requesting a refill of adderall  LF 7/2/24  LOV 1/30/24  Please approve or deny pended RX

## 2024-08-05 NOTE — TELEPHONE ENCOUNTER
From: Caryn Adele Behnke  To: KOBY KRUSE  Sent: 8/3/2024 10:29 AM CDT  Subject: Refill    Hello, I just noticed that I ran out of adderall. Can I please have a refill sent? Is there any way to get multiples sent since I forget to call all the time ?  Thanks   Dona

## 2024-08-05 NOTE — TELEPHONE ENCOUNTER
please see pt [employer] message below. Thank you.    From: Luis Miguel Shelton  To: Cat Lee  Sent: 1/10/2024  5:03 PM CST  Subject: Tests for Upcoming Surgery    Hi Doctor,    Attached is the letter I was supposed to give you last Friday!  Sorry. I didn't know it was in my other [employer] account.      This is from Dr Flor -- the surgeon.    Can you have your assistant schedule the necessary tests?      Thank you!    Serafin   Due for follow up   We typically send three 30 day rx at follow up visit.

## 2024-08-12 ENCOUNTER — TELEPHONE (OUTPATIENT)
Dept: FAMILY MEDICINE CLINIC | Facility: CLINIC | Age: 39
End: 2024-08-12

## 2024-08-12 NOTE — TELEPHONE ENCOUNTER
Patient is having ocular migraines. She is getting them 2-3 times a month and they make it to where she can not see. Patient is wondering if she can get a preventative medication.     Patient advised she needs an OV.

## 2024-08-13 ENCOUNTER — VIRTUAL PHONE E/M (OUTPATIENT)
Dept: FAMILY MEDICINE CLINIC | Facility: CLINIC | Age: 39
End: 2024-08-13
Payer: COMMERCIAL

## 2024-08-13 DIAGNOSIS — F98.8 ATTENTION DEFICIT DISORDER (ADD) WITHOUT HYPERACTIVITY: ICD-10-CM

## 2024-08-13 DIAGNOSIS — F32.A ANXIETY AND DEPRESSION: ICD-10-CM

## 2024-08-13 DIAGNOSIS — F41.9 ANXIETY AND DEPRESSION: ICD-10-CM

## 2024-08-13 DIAGNOSIS — G43.E09 CHRONIC MIGRAINE WITH AURA WITHOUT STATUS MIGRAINOSUS, NOT INTRACTABLE: Primary | ICD-10-CM

## 2024-08-13 PROCEDURE — 99213 OFFICE O/P EST LOW 20 MIN: CPT | Performed by: FAMILY MEDICINE

## 2024-08-13 RX ORDER — GALCANEZUMAB 120 MG/ML
240 INJECTION, SOLUTION SUBCUTANEOUS ONCE
Qty: 2 ML | Refills: 0 | Status: SHIPPED | OUTPATIENT
Start: 2024-08-13 | End: 2024-08-13

## 2024-08-13 NOTE — PROGRESS NOTES
Virtual Telephone Check-In    Caryn Adele Behnke verbally consents to a Virtual/Telephone Check-In visit on 08/13/24.  Patient has been referred to the [facility] website at [email or web address] to review the yearly Consent to Treat document.    Patient understands and accepts financial responsibility for any deductible, co-insurance and/or co-pays associated with this service.    Duration of the service: 25 minutes      Summary of topics discussed:     Migraines   Had MRA completed no acute issues   Has 1-2 clusters of headaches per month, and cluster episodes last avg 2-3 days.   Other migraines less severe.   She has been getting ocular sx - but no headache.   Is getting visual aura sx for about 1 mo  Rizatriptan helps for most part  Total sx days >15     Depression   Depressive episodes are not as frequent   But she is noticing PMS like mood sx - now on OCP by gyn   Concerned she might be gaining weight with OCP.       ADHD   Taking adderall   Better in the morning - able to get things done at work.   Memory is still not ideal   Taking women's daily and hair/skin/nail vitamin daily   Sleeping about 4-5 hours/night   Able to fall asleep right away    Diagnoses and all orders for this visit:    Chronic migraine with aura without status migrainosus, not intractable  -     Galcanezumab-gnlm (EMGALITY) 120 MG/ML Subcutaneous Solution Prefilled Syringe; Inject 240 mg into the skin one time for 1 dose.    Attention deficit disorder (ADD) without hyperactivity    Anxiety and depression            Did not like venlafaxine - did not feel well on medication   Amitiriptyline may cause too much drowsiness   Topiramate will interfere with OCP   Nurtec odt every other day is an option - but patient not sure if she will be able to take every other day and not forget   Injectable CGRP may be best option   Emgality 240mg subcutaneous as a single loading dose, followed by 120mg subcutaneous monthly        KOBY KRUSE MD

## 2024-08-23 ENCOUNTER — TELEPHONE (OUTPATIENT)
Dept: FAMILY MEDICINE CLINIC | Facility: CLINIC | Age: 39
End: 2024-08-23

## 2024-08-23 DIAGNOSIS — G43.E09 CHRONIC MIGRAINE WITH AURA WITHOUT STATUS MIGRAINOSUS, NOT INTRACTABLE: ICD-10-CM

## 2024-08-23 RX ORDER — GALCANEZUMAB 120 MG/ML
240 INJECTION, SOLUTION SUBCUTANEOUS ONCE
Qty: 2 ML | Refills: 0 | Status: SHIPPED | OUTPATIENT
Start: 2024-08-23 | End: 2024-08-23

## 2024-08-28 NOTE — TELEPHONE ENCOUNTER
PA denied  Fax with denial reason not received  Called [facility] and they are re faxing denial reason

## 2024-08-29 NOTE — TELEPHONE ENCOUNTER
She has greater than 8 days of sx as documented in note     She is not taking nurtec or other CGRP medication   She does not think taking nurtec every other day is something she can do.   Currently triptan is abortive therapy     Unclear why denied

## 2024-08-29 NOTE — TELEPHONE ENCOUNTER
PA denied  This medication is covered if you have 4-7 migraines per month  Greater than or equal to 8 migraine days per month  The medication will not be used in combination with another calcitonin gene related peptide antagonist or inhibitor used for the preventive treatment of migraine (ajovy, nurtec ODT, qulipta, vyepti)    Please advise

## 2024-08-30 ENCOUNTER — PATIENT MESSAGE (OUTPATIENT)
Dept: FAMILY MEDICINE CLINIC | Facility: CLINIC | Age: 39
End: 2024-08-30

## 2024-08-30 ENCOUNTER — TELEPHONE (OUTPATIENT)
Dept: FAMILY MEDICINE CLINIC | Facility: CLINIC | Age: 39
End: 2024-08-30

## 2024-08-30 NOTE — TELEPHONE ENCOUNTER
Prior authorization approved  Payer: [facility] [employer] Commerical Case ID: [id number]    [phone]    [phone]  Note from payer: Request Reference Number: [id number].  EMGALITY     INJ 120MG/ML is approved through 09/12/2024.  Your patient may now fill this prescription and it will be covered.    Message sent to patient regarding approval.

## 2024-08-30 NOTE — TELEPHONE ENCOUNTER
Prior authorization approved  Payer: [facility] [employer] Commerical Case ID: [id number]    [phone]    [phone]  Note from payer: Request Reference Number: [id number].  EMGALITY     INJ 120MG/ML is approved through 09/12/2024.  Your patient may now fill this prescription and it will be covered.

## 2024-09-10 DIAGNOSIS — F98.8 ATTENTION DEFICIT DISORDER (ADD) WITHOUT HYPERACTIVITY: ICD-10-CM

## 2024-09-10 RX ORDER — DEXTROAMPHETAMINE SACCHARATE, AMPHETAMINE ASPARTATE MONOHYDRATE, DEXTROAMPHETAMINE SULFATE AND AMPHETAMINE SULFATE 5; 5; 5; 5 MG/1; MG/1; MG/1; MG/1
20 CAPSULE, EXTENDED RELEASE ORAL DAILY
Qty: 30 CAPSULE | Refills: 0 | Status: SHIPPED | OUTPATIENT
Start: 2024-09-10 | End: 2024-10-10

## 2024-09-10 NOTE — TELEPHONE ENCOUNTER
Patient requesting refill of adderall  LF 8/5    LOV 1/30/24  RTC in 3 months  No upcoming appointment scheduled    Please approve or deny pended RX

## 2024-09-10 NOTE — TELEPHONE ENCOUNTER
Caryn Adele Behnke requesting Medication Refill for:      Amphetamine-Dextroamphet ER (ADDERALL XR) 20 MG Oral Capsule SR 24 Hr     Preferred Pharmacy:   [id number] [address] [place] [phone], [phone]       LOV: 1/30/2024   Last Refill date: 08/05/2024  Next Scheduled appointment:

## 2024-10-08 ENCOUNTER — HOSPITAL ENCOUNTER (OUTPATIENT)
Dept: GENERAL RADIOLOGY | Age: 39
Discharge: HOME | End: 2024-10-08
Attending: PODIATRIST
Payer: COMMERCIAL

## 2024-10-08 ENCOUNTER — OFFICE VISIT (OUTPATIENT)
Facility: LOCATION | Age: 39
End: 2024-10-08
Payer: COMMERCIAL

## 2024-10-08 VITALS — DIASTOLIC BLOOD PRESSURE: 76 MMHG | SYSTOLIC BLOOD PRESSURE: 118 MMHG

## 2024-10-08 DIAGNOSIS — M72.2 PLANTAR FASCIITIS OF RIGHT FOOT: Primary | ICD-10-CM

## 2024-10-08 DIAGNOSIS — M62.461 GASTROCNEMIUS EQUINUS OF RIGHT LOWER EXTREMITY: ICD-10-CM

## 2024-10-08 DIAGNOSIS — M79.671 RIGHT FOOT PAIN: ICD-10-CM

## 2024-10-08 PROCEDURE — 20550 NJX 1 TENDON SHEATH/LIGAMENT: CPT | Performed by: PODIATRIST

## 2024-10-08 PROCEDURE — 73630 X-RAY EXAM OF FOOT: CPT | Performed by: PODIATRIST

## 2024-10-08 PROCEDURE — 99203 OFFICE O/P NEW LOW 30 MIN: CPT | Performed by: PODIATRIST

## 2024-10-08 RX ORDER — DEXAMETHASONE SODIUM PHOSPHATE 10 MG/ML
10 INJECTION INTRAMUSCULAR; INTRAVENOUS ONCE
Status: COMPLETED | OUTPATIENT
Start: 2024-10-08 | End: 2024-10-08

## 2024-10-08 NOTE — PROGRESS NOTES
[facility]  Progress Note    Caryn Adele Behnke is a 39 year old female.   Chief Complaint   Patient presents with    Foot Pain     R heel - Pt states pain in  heel going up back of the ankle.  Pain worse when on her feet for long hours. Some numbness and tingling at times. Pain onset for 2 months.          HPI:     Patient is a pleasant 39-year-old female who is presenting to clinic today with complaints of right heel pain.  Patient states that this has been ongoing for a couple months now.  She states that the pain does worsen the longer she is on her feet.  Patient does work in the [employer] business and is on her feet at times for long periods of time.  She has tried over-the-counter inserts, new shoes, stretching of her Achilles, and is continuing to have pain.  Patient is currently in [employer] with Dr. Ashley's inserts.  Denies injuries.  No other concerns.  Denies recent nausea, vomiting, fevers, chills.      Allergies: Ibuprofen and Lactose   Current Outpatient Medications   Medication Sig Dispense Refill    Rizatriptan Benzoate 10 MG Oral Tab TAKE ONE TABLET BY MOUTH ONE TIME DAILY AS NEEDED FOR MIGRAINE. MAY REPEAT IN 2 HOURS IF NEEDED. MAXOF 2 TABLETS IN 24 HOURS 10 tablet 2    Amphetamine-Dextroamphet ER (ADDERALL XR) 20 MG Oral Capsule SR 24 Hr Take 1 capsule (20 mg total) by mouth daily. 30 capsule 0    Norethin Ace-Eth Estrad-FE (JUNEL FE 1.5/30) 1.5-30 MG-MCG Oral Tab Take 1 tablet by mouth daily. 84 tablet 1    Rimegepant Sulfate (NURTEC) 75 MG Oral Tablet Dispersible Take 75 mg by mouth as needed. Take one tablet at onset of migraine.  Maximum dose in 24 hours is 1 tablet (75mg). (Patient not taking: Reported on 4/26/2024) 4 tablet 0    cyclobenzaprine 5 MG Oral Tab Take 1-2 tablets (5-10 mg total) by mouth 3 (three) times daily as needed for Muscle spasms. Do not take while driving. Do not drink alcohol with use (Patient not taking: Reported on 4/26/2024) 60 tablet 0      Past  Medical History:    Lipid screening    Unspecified vitamin D deficiency      Past Surgical History:   Procedure Laterality Date    Appendectomy  1996    Appendectomy        Family History   Problem Relation Age of Onset    Other (Bipolar disorder) Father     Other (Depression) Father     Diabetes Mother     Other (Depression) Mother     Other (Hypothyroidism) Mother     Breast Cancer Maternal Grandmother     Breast Cancer Paternal Grandmother 50    Cancer Paternal Grandmother     Heart Disease Paternal Grandmother     Other (Other) Paternal Grandmother     Other (HTN) Paternal Grandmother     Other (Depression) Brother     Schizophrenia Brother     Other (proteus syndrome) Brother       Social History     Socioeconomic History    Marital status:    Tobacco Use    Smoking status: Former     Current packs/day: 0.00     Average packs/day: 1 pack/day for 18.0 years (18.0 ttl pk-yrs)     Types: Cigarettes     Start date:      Quit date: 2013     Years since quittin.6    Smokeless tobacco: Never    Tobacco comments:     over 1 pk. daily   Vaping Use    Vaping status: Never Used   Substance and Sexual Activity    Alcohol use: Yes     Alcohol/week: 1.0 standard drink of alcohol     Types: 1 Glasses of wine per week     Comment: \"rare\"    Drug use: No    Sexual activity: Not Currently     Partners: Male     Birth control/protection: Abstinence   Other Topics Concern    Caffeine Concern Yes     Comment: soda    Exercise Yes     Comment: \"occasionally\"    Seat Belt Yes           REVIEW OF SYSTEMS:     10 point ROS completed and was negative, except for pertinent positive and negatives stated in subjective.       EXAM:     Right lower extremity focused exam:  GENERAL: well developed, well nourished, in no apparent distress  EXTREMITIES:  1. Integument: Skin appears moist, warm, and supple with positive hair growth. There are no color changes. No open lesions. No macerations. No Hyperkeratotic lesions.    2. Vascular: Dorsalis pedis 2/4 bilateral and posterior tibial pulses 2/4 bilateral, capillary refill normal.  3. Neurological: Gross sensation intact via light touch bilaterally.  Normal sharp/dull sensation  4. Musculoskeletal: All muscle groups are graded 5/5 in the foot and ankle with no pain elicited.  Patient does have pain with palpation to the plantar medial aspect of the right heel near the origin of plantar fascia.  No pain with side-to-side heel squeeze.  No pain to the posterior heel or along the course of the Achilles tendon.  No pain with palpation to posterior tibial tendon or peroneal tendons.  Patient does have decreased ankle joint dorsiflexion with the knee extended, which slightly improves with the knee flexed consistent with an equinus deformity.       ASSESSMENT AND PLAN:   Diagnoses and all orders for this visit:    Plantar fasciitis of right foot    Gastrocnemius equinus of right lower extremity    Right foot pain  -     XR FOOT WEIGHTBEARING (3 VIEWS), RIGHT   (CPT=73630); Future        Plan:   -Patient was seen and evaluated today in clinic.  Chart history reviewed.    -I have reviewed patient's chart, clinical findings and diagnosis related to condition with the patient in detail.  -Radiographs were obtained today, showing no acute osseous abnormalities.  -Basic mechanical principles reviewed. Discussed potential etiology and treatment options.  -Explained that the plantar fascia is a connective tissue/ligament on the bottom of the foot.   -This is an overuse injury and progress is often slow/gradual.   -Discussed importance of managing the inflammation and biomechanical issues as well as the importance of adhering to the conservative treatment instructions given.  -Pt educated and given a handout on proper footwear and importance of supportive shoes.   -Pt demonstrated and given handout with recommended home stretching routine.    -I have recommended OTC supplemental arch supports such  as Spenco/Powerstep/Redithotics prefab orthotics.  Can look into custom orthotic options in the future.  -We discussed options for OTC vs. Prescriptions NSAID's and GI precautions reviewed.  -Recommendations for ice/gel pack to affected area 2-3 times daily and especially after activity or when symptoms are most prevalent.   -Modify activity according to tolerance of pain/symptoms.  -Additionally, cortisone injections, physical therapy, and immobilization can be considered.  -Surgical intervention may be considered if all conservative measures fail to resolve patient's symptoms.  -Patient does elect to move forward with formal physical therapy.  A referral was placed today.  Injections series: First injection today (10/8/2024)    Plantar fascia injection (CPT: 30260): Right plantar fascia injection  -Discussed steroid injection with patient including benefits and risks.  Patient agrees to injection and written consent was obtained.  -After prepping site with alcohol, administered steroid injection to the plantar medial aspect of the right heel consisting of 1 cc of 0.5% Marcaine plain, 1 cc of Kenalog 10, and 1 cc of dexamethasone.  Patient tolerated the injection well and there were no complications.  Site was dressed with Band-Aid.      -The patient indicates understanding of these issues and agrees to the plan.    Time spent reviewing pertinent information from patient's chart, reviewing any pertinent imaging, obtaining history and physical exam, discussing and mutually agreeing on a treatment plan, and documenting encounter: 30 minutes    RTC 4 to 6 weeks from starting physical therapy      Jacinto Biggs DPM        [facility]  [address] [place] [id number]     10/8/2024    Dragon speech recognition software was used to prepare this note.  Errors in word recognition may occur.  Please contact me with any questions/concerns with this note.

## 2024-10-08 NOTE — PROCEDURES
Per Dr Biggs, draw up 1ml Marcaine, 1ml [id number] and 1ml Dexamethasone for right heel injection.

## 2024-10-20 DIAGNOSIS — Z30.011 ENCOUNTER FOR INITIAL PRESCRIPTION OF CONTRACEPTIVE PILLS: ICD-10-CM

## 2024-10-24 RX ORDER — NORETHINDRONE ACETATE/ETHINYL ESTRADIOL AND FERROUS FUMARATE 1.5-30(21)
1 KIT ORAL DAILY
Qty: 84 TABLET | Refills: 1 | Status: SHIPPED | OUTPATIENT
Start: 2024-10-24

## 2024-11-05 ENCOUNTER — TELEPHONE (OUTPATIENT)
Dept: PHYSICAL THERAPY | Facility: HOSPITAL | Age: 39
End: 2024-11-05

## 2024-11-05 ENCOUNTER — OFFICE VISIT (OUTPATIENT)
Dept: PHYSICAL THERAPY | Age: 39
End: 2024-11-05
Attending: PODIATRIST
Payer: COMMERCIAL

## 2024-11-05 DIAGNOSIS — M79.671 RIGHT FOOT PAIN: ICD-10-CM

## 2024-11-05 DIAGNOSIS — M72.2 PLANTAR FASCIITIS OF RIGHT FOOT: Primary | ICD-10-CM

## 2024-11-05 DIAGNOSIS — M62.461 GASTROCNEMIUS EQUINUS OF RIGHT LOWER EXTREMITY: ICD-10-CM

## 2024-11-05 PROCEDURE — 97110 THERAPEUTIC EXERCISES: CPT

## 2024-11-05 PROCEDURE — 97161 PT EVAL LOW COMPLEX 20 MIN: CPT

## 2024-11-05 NOTE — PROGRESS NOTES
LOWER EXTREMITY EVALUATION:   Referring Physician: Dr. Biggs  Diagnosis: Right Sided Plantar Fasciitis     Date of Service: 11/5/2024     PATIENT SUMMARY   Caryn Adele Behnke is a 39 year old y/o female who presents to therapy today with complaints of right sided heel pain originating over three months ago with an insidious onset.  She states that the pain does worsen the longer she is on her feet. 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 She has tried over-the-counter inserts, new shoes, stretching of her Achilles, and is continuing to have pain. Patient is currently in [employer] with Dr. Ashley's inserts. .  The patient's symptoms are increased with sustained stance and ambulation and lessened with rest.    Pain: current 0/10, best 0/10, worst 7/10  Current functional limitations include decreased tolerance for gait and exercise.  .   Dona describes prior level of function as unlimited. Pt goals include to have less pain and increased mobility of the foot and ankle.  .  Past medical history was reviewed with Dona. Significant findings include  has a past medical history of Lipid screening (1/26/2012) and Unspecified vitamin D deficiency.      ASSESSMENT  The patient presents with the signs and symptoms of the Rehab Diagnosis of right sided plantar fasciitis supported by the clinical findings of decreased tolerance for weight bearing, pain on palpation, and decreased flexibility of the plantar fascia;    Dona would benefit from skilled Physical Therapy to address the above impairments to increase functional mobility of the right foot and ankle and to instruct on a HEP for independent management of her condition     Precautions:  None  OBJECTIVE:   Observation: The patient presents with the structural assessment of a compensated forefoot varus foot type.    Gait: Increased toeing in bilaterally L>>R.    Palpation: Increased reactivity with palpation at  the distal calcaneus of the right LE.      AROM/PROM: Normal AROM/PROM of the bilateral hips, knees, and ankles.    *denotes pain when testing    Accessory motion: The patient presents with moderate restrictions of the posterior glide at the talocrual joint(s).      Flexibility: The patient presents with increased restrictions of gastrocsoleus complex.      Strength/MMT: Full and painfree MMT of the bilateral Les.    *denotes pain when testing    Special tests: Negative special tests for the foot and ankle.      Functional Screen: Normal    Today’s Treatment and Response:  Patient education provided on 11/5/2024 regarding the examination findings, stage of condition, and subjective/tissue reactivity.   Patient was instructed in and issued a HEP for flexibility of the foot and ankle.  The patient demonstrated safe technique with exercise completion and vocalized understanding of informational material provided.    Charges: PT Eval Low Complexity, TherEx 1      Total Timed Treatment: 40 min     Total Treatment Time: 40 min     In agreement with LEFS score and clinical rationale, this evaluation involved Low Complexity decision making due to 1-2 personal factors/comorbidities.    PLAN OF CARE:    Goals (to be met in 8 visits):    1. Improve upon LEFS assessment > 11% from INE to DC.  2. Patient will be aware of postural limitations and be able to correct them independently.    3. Patient will have an increase in foot and ankle mobility to equal the opposite side in order to return to more normalized gait and funciton.    4. Patient will have an increase in foot and ankle strength to assist with returning to more normalzied gait  5. Patient will demonstrate an increase in MLT of the bilateral LEs in order to return to more normalized gait.     Frequency / Duration: Patient will be seen for 2 x/week or a total of 12 visits over a 90 day period. Treatment will include: Manual Therapy; Therapeutic Exercises; Neuromuscular  Re-education; Therapeutic Activity; Gait Training; Electrical Stim; Pt education; Home exercise program instructions.      Education or treatment limitation: None  Rehab Potential:good    LEFS Score  LEFS Score: (Patient-Rptd) 45 % (11/5/2024  2:14 PM)      Patient/Family/Caregiver was advised of these findings, precautions, and treatment options and has agreed to actively participate in planning and for this course of care.    Thank you for your referral. Please co-sign or sign and return this letter via fax as soon as possible to [phone]. If you have any questions, please contact me at Dept: [phone]    Sincerely,  Electronically signed by therapist: Angelo Diaz, PT, DPT, MTC, FAAOMPT    Physician's certification required: Yes  I certify the need for these services furnished under this plan of treatment and while under my care.    X___________________________________________________ Date____________________    Certification From: 11/5/2024  To:2/3/2025

## 2024-11-06 DIAGNOSIS — G43.E09 CHRONIC MIGRAINE WITH AURA WITHOUT STATUS MIGRAINOSUS, NOT INTRACTABLE: ICD-10-CM

## 2024-11-06 DIAGNOSIS — F98.8 ATTENTION DEFICIT DISORDER (ADD) WITHOUT HYPERACTIVITY: ICD-10-CM

## 2024-11-06 NOTE — TELEPHONE ENCOUNTER
Caryn Adele Behnke requesting Medication Refill for:    Medication name and dose (copy and paste from medication list): Amphetamine-Dextroamphet ER (ADDERALL XR) 20 MG Oral Capsule SR 24 Hr     If medication is not on medication list - transfer patient to RN queue for triage    Preferred Pharmacy: [id number] - [place], [place]     LOV: 1/30/2024   Last Refill date: 10/8/24  Next Scheduled appointment:

## 2024-11-06 NOTE — TELEPHONE ENCOUNTER
Patient stated she was unaware that this medication needed to be refrigerated. So, the medication has gone bad. She is needing a new fill, but stated that provider needs to contact the pharmacy to \"okay\" early fill for insurance.

## 2024-11-07 RX ORDER — DEXTROAMPHETAMINE SACCHARATE, AMPHETAMINE ASPARTATE MONOHYDRATE, DEXTROAMPHETAMINE SULFATE AND AMPHETAMINE SULFATE 5; 5; 5; 5 MG/1; MG/1; MG/1; MG/1
20 CAPSULE, EXTENDED RELEASE ORAL EVERY MORNING
Qty: 30 CAPSULE | Refills: 0 | Status: SHIPPED | OUTPATIENT
Start: 2024-11-07 | End: 2024-12-07

## 2024-11-07 RX ORDER — GALCANEZUMAB 120 MG/ML
240 INJECTION, SOLUTION SUBCUTANEOUS ONCE
Qty: 2 ML | Refills: 0 | Status: SHIPPED | OUTPATIENT
Start: 2024-11-07 | End: 2024-11-07

## 2024-11-07 NOTE — TELEPHONE ENCOUNTER
Galcanezumab-alex (EMGALITY) 120 MG/ML Subcutaneous Solution Prefilled Syringe     Also clarified that it is the starting dose that she needs

## 2024-11-07 NOTE — TELEPHONE ENCOUNTER
Patient was scheduled for nurse visit on 10/8/24 - she came with Emgality that day and it had not been refrigerated. She was instructed that day that she needed refrigerate (also noted on rx). Nurse visit canceled and new rx sent on 10/8/24       Will refill 1 time - if she does not follow instructions - she will be referred to neurology   Initial dose is 240mg. Subsequent months are 120mg

## 2024-11-16 ENCOUNTER — OFFICE VISIT (OUTPATIENT)
Dept: FAMILY MEDICINE CLINIC | Facility: CLINIC | Age: 39
End: 2024-11-16
Payer: COMMERCIAL

## 2024-11-16 VITALS
DIASTOLIC BLOOD PRESSURE: 64 MMHG | RESPIRATION RATE: 16 BRPM | OXYGEN SATURATION: 98 % | HEART RATE: 88 BPM | BODY MASS INDEX: 33.66 KG/M2 | TEMPERATURE: 99 F | HEIGHT: 63 IN | SYSTOLIC BLOOD PRESSURE: 110 MMHG | WEIGHT: 190 LBS

## 2024-11-16 DIAGNOSIS — R52 GENERALIZED BODY ACHES: Primary | ICD-10-CM

## 2024-11-16 PROCEDURE — 87637 SARSCOV2&INF A&B&RSV AMP PRB: CPT | Performed by: NURSE PRACTITIONER

## 2024-11-16 PROCEDURE — 99213 OFFICE O/P EST LOW 20 MIN: CPT | Performed by: NURSE PRACTITIONER

## 2024-11-16 NOTE — PROGRESS NOTES
CHIEF COMPLAINT:     Chief Complaint   Patient presents with    Flu     Body aches, waves of dizziness, started this morning. Fatigue. Denies fever as does not have thermometer.        HPI:   Caryn Adele Behnke is a 39 year old female who presents for complaints of generalized body aches, fatigue and headache. She reports history of migraines and has taken her migraine relief medications. She reports no upper respiratory symptoms but  reports feelings of \"thickness in back of throat\". These symptoms have been present since this morning.  Symptoms have been persisting since onset.  Treating symptoms with migraine management.  Denies SOB, loss of taste or smell. Denies cough. Denies n/v/d.    Current Outpatient Medications   Medication Sig Dispense Refill    Amphetamine-Dextroamphet ER (ADDERALL XR) 20 MG Oral Capsule SR 24 Hr Take 1 capsule (20 mg total) by mouth every morning. 30 capsule 0    JIM FE 1.5/30 1.5-30 MG-MCG Oral Tab TAKE ONE TABLET BY MOUTH ONCE DAILY 84 tablet 1    Rizatriptan Benzoate 10 MG Oral Tab TAKE ONE TABLET BY MOUTH ONE TIME DAILY AS NEEDED FOR MIGRAINE. MAY REPEAT IN 2 HOURS IF NEEDED. MAXOF 2 TABLETS IN 24 HOURS 10 tablet 2    Rimegepant Sulfate (NURTEC) 75 MG Oral Tablet Dispersible Take 75 mg by mouth as needed. Take one tablet at onset of migraine.  Maximum dose in 24 hours is 1 tablet (75mg). 4 tablet 0    cyclobenzaprine 5 MG Oral Tab Take 1-2 tablets (5-10 mg total) by mouth 3 (three) times daily as needed for Muscle spasms. Do not take while driving. Do not drink alcohol with use 60 tablet 0      Past Medical History:    Lipid screening    Unspecified vitamin D deficiency      Past Surgical History:   Procedure Laterality Date    Appendectomy  1/1/1996    Appendectomy           Social History     Socioeconomic History    Marital status:    Tobacco Use    Smoking status: Former     Current packs/day: 0.00     Average packs/day: 1 pack/day for 18.0 years (18.0 ttl pk-yrs)      Types: Cigarettes     Start date:      Quit date: 2013     Years since quittin.7    Smokeless tobacco: Never    Tobacco comments:     over 1 pk. daily   Vaping Use    Vaping status: Never Used   Substance and Sexual Activity    Alcohol use: Yes     Alcohol/week: 1.0 standard drink of alcohol     Types: 1 Glasses of wine per week     Comment: \"rare\"    Drug use: No    Sexual activity: Not Currently     Partners: Male     Birth control/protection: Abstinence   Other Topics Concern    Caffeine Concern Yes     Comment: soda    Exercise Yes     Comment: \"occasionally\"    Seat Belt Yes         REVIEW OF SYSTEMS:   GENERAL: no change in appetite  SKIN: no rashes or abnormal skin lesions  HEENT: See HPI  LUNGS: See HPI  CARDIOVASCULAR: denies chest pain or palpitations   GI: denies N/V/C or abdominal pain      EXAM:   /64   Pulse 88   Temp 98.5 °F (36.9 °C) (Oral)   Resp 16   Ht 5' 3\" (1.6 m)   Wt 190 lb (86.2 kg)   LMP 2024 (Exact Date)   SpO2 98%   BMI 33.66 kg/m²   GENERAL: well developed, well nourished,in no apparent distress  SKIN: no rashes,no suspicious lesions  HEAD: atraumatic, normocephalic.    EYES: conjunctiva clear, EOM intact  EARS: TM's bilaterally non-erythematous, no bulging, no retraction, no fluid, bony landmarks clearly visualized  NOSE: Nostrils patent, no nasal discharge, nasal mucosa pink and non-inflamed   THROAT: Oral mucosa pink, moist. Posterior pharynx is not erythematous. No exudates. Tonsils 2/4.    NECK: Supple, non-tender  LUNGS: clear to auscultation bilaterally, no wheezes or rhonchi. Breathing is non labored.  CARDIO: RRR without murmur  EXTREMITIES: no cyanosis, clubbing or edema  LYMPH:  No lymphadenopathy.        ASSESSMENT AND PLAN:   Caryn Adele Behnke is a 39 year old female who presents with upper respiratory symptoms that are consistent with    ASSESSMENT:     Encounter Diagnosis   Name Primary?    Generalized body aches Yes       Orders Placed  This Encounter   Procedures    SARS-CoV-2/Flu A and B/RSV by PCR ([facility]) [E]       Meds & Refills for this Visit:  Requested Prescriptions      No prescriptions requested or ordered in this encounter       Imaging & Consults:  None    PLAN: Comfort care as described in Patient Instructions. Will await QUAD results.  To ED for worsening headache, n/v/ or dizziness.   Increase fluid intake  The patient indicates understanding of these issues and agrees to the plan.  The patient is asked to f/u with PCP if sx's persist or worsen.

## 2024-11-17 LAB
FLUAV + FLUBV RNA SPEC NAA+PROBE: NOT DETECTED
FLUAV + FLUBV RNA SPEC NAA+PROBE: NOT DETECTED
RSV RNA SPEC NAA+PROBE: NOT DETECTED
SARS-COV-2 RNA RESP QL NAA+PROBE: NOT DETECTED

## 2024-11-19 ENCOUNTER — OFFICE VISIT (OUTPATIENT)
Dept: PHYSICAL THERAPY | Age: 39
End: 2024-11-19
Attending: PODIATRIST
Payer: COMMERCIAL

## 2024-11-19 PROCEDURE — 97110 THERAPEUTIC EXERCISES: CPT

## 2024-11-19 PROCEDURE — 97140 MANUAL THERAPY 1/> REGIONS: CPT

## 2024-11-19 NOTE — PROGRESS NOTES
Dx: Right Sided Plantar Fasciitis         Authorized # of Visits:  NA         Next MD visit: none scheduled  Fall Risk: standard         Precautions: n/a           Goal Number: 8    Subjective: States that the foot is good and bad.  States that the shoes have been doing well so far.  No orthotics to this point due to the expense.  States that she has minimized her breaks at work as when she takes breaks the pain tends to worsen.  Pain is worse when she walks on the outside of her foot.      Objective: Treatment initiated per treatment log.      Date:11/19/2024 TX#: 2/NA Date:               TX#: 3/   Date:               TX#: 4/ Date:               TX#: 5/ Date:               TX#: 6/ Date:               TX#: 7/ Date:               TX#: 8/   VAS 3-4/10 VAS /10 VAS /10 VAS /10 VAS /10 VAS /10 VAS /10   TherEx (25 Min)         Upright bike L5 x 6 minutes         Prostretch 30 sec x 5         FHL stretch 30 sec x 5         Talar doming x 20                                    Manual PT (15 Min)         Posterior talocrual glide and distraction                             Assessment: The patient responded well to today's intervention.  Was able to replicate HEP issued during initial exam.        Goals (to be met in 8 visits):    1. Improve upon LEFS assessment > 11% from INE to DC.  2. Patient will be aware of postural limitations and be able to correct them independently.    3. Patient will have an increase in foot and ankle mobility to equal the opposite side in order to return to more normalized gait and funciton.    4. Patient will have an increase in foot and ankle strength to assist with returning to more normalzied gait  5. Patient will demonstrate an increase in MLT of the bilateral LEs in order to return to more normalized gait.     Plan: Assess response to today's treatment and progress as tolerated.      Charges: TherEx 2 (25 min); Manual PT 1 (15 min)      Total Timed Treatment: 40 min  Total Treatment Time: 40  min

## 2024-12-04 ENCOUNTER — OFFICE VISIT (OUTPATIENT)
Dept: PHYSICAL THERAPY | Age: 39
End: 2024-12-04
Attending: PODIATRIST
Payer: COMMERCIAL

## 2024-12-04 PROCEDURE — 97140 MANUAL THERAPY 1/> REGIONS: CPT

## 2024-12-04 PROCEDURE — 97110 THERAPEUTIC EXERCISES: CPT

## 2024-12-04 NOTE — PROGRESS NOTES
Dx: Right Sided Plantar Fasciitis         Authorized # of Visits:  NA         Next MD visit: none scheduled  Fall Risk: standard         Precautions: n/a           Goal Number: 8    Subjective: States that the foot has been more painful as of late. States that her oofos have been helpful     Objective: Treatment progressed per treatment log.      Date:11/19/2024 TX#: 2/NA Date:12/4/2024  TX#: 3/   Date:               TX#: 4/ Date:               TX#: 5/ Date:               TX#: 6/ Date:               TX#: 7/ Date:               TX#: 8/   VAS 3-4/10 VAS 6/10 VAS /10 VAS /10 VAS /10 VAS /10 VAS /10   TherEx (25 Min) TherEx (25 Min)        Upright bike L5 x 6 minutes FHL stretch 30 sec x 5        Prostretch 30 sec x 5 FWD leans in doorway x 20        FHL stretch 30 sec x 5         Talar doming x 20                                    Manual PT (15 Min) Manual PT (15 Min)        Posterior talocrual glide and distraction Posterior talocrual glide and distraction                            Assessment: The patient responded well to today's intervention.  Increased mobility with treatment today.  1st MTP extension limiting factor.        Goals (to be met in 8 visits):    1. Improve upon LEFS assessment > 11% from INE to DC.  2. Patient will be aware of postural limitations and be able to correct them independently.    3. Patient will have an increase in foot and ankle mobility to equal the opposite side in order to return to more normalized gait and funciton.    4. Patient will have an increase in foot and ankle strength to assist with returning to more normalzied gait  5. Patient will demonstrate an increase in MLT of the bilateral LEs in order to return to more normalized gait.     Plan: Assess response to today's treatment and progress as tolerated.      Charges: TherEx 2 (25 min); Manual PT 1 (15 min)      Total Timed Treatment: 40 min  Total Treatment Time: 40 min

## 2024-12-10 ENCOUNTER — HOSPITAL ENCOUNTER (OUTPATIENT)
Dept: GENERAL RADIOLOGY | Age: 39
Discharge: HOME | End: 2024-12-10
Attending: PODIATRIST
Payer: COMMERCIAL

## 2024-12-10 ENCOUNTER — PATIENT MESSAGE (OUTPATIENT)
Dept: FAMILY MEDICINE CLINIC | Facility: CLINIC | Age: 39
End: 2024-12-10

## 2024-12-10 ENCOUNTER — OFFICE VISIT (OUTPATIENT)
Facility: LOCATION | Age: 39
End: 2024-12-10
Payer: COMMERCIAL

## 2024-12-10 DIAGNOSIS — M72.2 PLANTAR FASCIITIS OF RIGHT FOOT: Primary | ICD-10-CM

## 2024-12-10 DIAGNOSIS — M25.571 ACUTE RIGHT ANKLE PAIN: ICD-10-CM

## 2024-12-10 DIAGNOSIS — M79.671 RIGHT FOOT PAIN: ICD-10-CM

## 2024-12-10 DIAGNOSIS — M62.461 GASTROCNEMIUS EQUINUS OF RIGHT LOWER EXTREMITY: ICD-10-CM

## 2024-12-10 PROCEDURE — 99213 OFFICE O/P EST LOW 20 MIN: CPT | Performed by: PODIATRIST

## 2024-12-10 PROCEDURE — 73610 X-RAY EXAM OF ANKLE: CPT | Performed by: PODIATRIST

## 2024-12-10 RX ORDER — DEXTROAMPHETAMINE SACCHARATE, AMPHETAMINE ASPARTATE, DEXTROAMPHETAMINE SULFATE AND AMPHETAMINE SULFATE 5; 5; 5; 5 MG/1; MG/1; MG/1; MG/1
20 TABLET ORAL DAILY
COMMUNITY

## 2024-12-10 NOTE — PROGRESS NOTES
[facility]  Progress Note      Caryn Adele Behnke is a 39 year old female.   Chief Complaint   Patient presents with    Heel Pain     Right- Patient states that pain was slightly improved after injection. Patient endorses tingling in the foot. Patient states that pain is worse now in the ankle. Rates pain 4/10.          HPI:     Patient is a pleasant 39-year-old female who is presenting to clinic today for recheck on right plantar fasciitis.  Patient states that she is still having pain in her heel.  She did have an injection at last visit, which she states was somewhat beneficial.  She also is currently in physical therapy and is unsure if she has noticed any benefits.  Rates the pain 4/10.  She also is complaining of ankle pain today.  She believes that this may be due to compensating for her heel pain.  Patient does share that she stepped off a curb awkwardly yesterday and has had pain throughout her lower extremity since.  She also endorses some tingling in her foot.  She continues to work for long hours on her feet at work as well, and notices the pain worsening throughout her shifts.  No other concerns.      Allergies: Ibuprofen and Lactose   Current Outpatient Medications   Medication Sig Dispense Refill    amphetamine-dextroamphetamine 20 MG Oral Tab Take 1 tablet (20 mg total) by mouth daily.      JIM FE 1.5/30 1.5-30 MG-MCG Oral Tab TAKE ONE TABLET BY MOUTH ONCE DAILY 84 tablet 1    Rizatriptan Benzoate 10 MG Oral Tab TAKE ONE TABLET BY MOUTH ONE TIME DAILY AS NEEDED FOR MIGRAINE. MAY REPEAT IN 2 HOURS IF NEEDED. MAXOF 2 TABLETS IN 24 HOURS 10 tablet 2    Rimegepant Sulfate (NURTEC) 75 MG Oral Tablet Dispersible Take 75 mg by mouth as needed. Take one tablet at onset of migraine.  Maximum dose in 24 hours is 1 tablet (75mg). 4 tablet 0    cyclobenzaprine 5 MG Oral Tab Take 1-2 tablets (5-10 mg total) by mouth 3 (three) times daily as needed for Muscle spasms. Do not take while driving. Do  not drink alcohol with use 60 tablet 0      Past Medical History:    Lipid screening    Unspecified vitamin D deficiency      Past Surgical History:   Procedure Laterality Date    Appendectomy  1996    Appendectomy        Family History   Problem Relation Age of Onset    Other (Bipolar disorder) Father     Other (Depression) Father     Diabetes Mother     Other (Depression) Mother     Other (Hypothyroidism) Mother     Breast Cancer Maternal Grandmother     Breast Cancer Paternal Grandmother 50    Cancer Paternal Grandmother     Heart Disease Paternal Grandmother     Other (Other) Paternal Grandmother     Other (HTN) Paternal Grandmother     Other (Depression) Brother     Schizophrenia Brother     Other (proteus syndrome) Brother       Social History     Socioeconomic History    Marital status:    Tobacco Use    Smoking status: Former     Current packs/day: 0.00     Average packs/day: 1 pack/day for 18.0 years (18.0 ttl pk-yrs)     Types: Cigarettes     Start date:      Quit date: 2013     Years since quittin.7    Smokeless tobacco: Never    Tobacco comments:     over 1 pk. daily   Vaping Use    Vaping status: Never Used   Substance and Sexual Activity    Alcohol use: Yes     Alcohol/week: 1.0 standard drink of alcohol     Types: 1 Glasses of wine per week     Comment: \"rare\"    Drug use: No    Sexual activity: Not Currently     Partners: Male     Birth control/protection: Abstinence   Other Topics Concern    Caffeine Concern Yes     Comment: soda    Exercise Yes     Comment: \"occasionally\"    Seat Belt Yes           REVIEW OF SYSTEMS:     10 point ROS completed and was negative unless stated in HPI.      EXAM:     Right lower extremity focused exam:  GENERAL: well developed, well nourished, in no apparent distress  EXTREMITIES:  1. Integument: Skin appears moist, warm, and supple with positive hair growth. There are no color changes. No open lesions. No macerations. No Hyperkeratotic  lesions.   2. Vascular: Dorsalis pedis 2/4 bilateral and posterior tibial pulses 2/4 bilateral, capillary refill normal.  3. Neurological: Gross sensation intact via light touch bilaterally.  Normal sharp/dull sensation  4. Musculoskeletal: All muscle groups are graded 5/5 in the foot and ankle with no pain elicited.  Patient does have continued pain with palpation to the plantar medial aspect of the right heel near the origin of plantar fascia.  Some pain with side-to-side heel squeeze.  No pain to the posterior heel or along the course of the Achilles tendon.  No pain with palpation to posterior tibial tendon or peroneal tendons.  Pain elicited with passive ROM of ankle and STJ.  Pain with palpation within the medial and anterior ankle gutters.  Mild discomfort with direct palpation to lateral STJ.  Minimal discomfort with palpation of sinus tarsi.  Patient does have decreased ankle joint dorsiflexion with the knee extended, which slightly improves with the knee flexed consistent with an equinus deformity.    PROCEDURE:  XR ANKLE (MIN 3 VIEWS), RIGHT (CPT=73610)      TECHNIQUE:  Three views were obtained.      COMPARISON:  None.      INDICATIONS:  M25.571 Acute right ankle pain      PATIENT STATED HISTORY: (As transcribed by Technologist)  Podiatry evaluation. Diffuse ankle pain for several months, plantar heel pain.          FINDINGS:  No acute fracture or dislocation is seen.  The ankle mortise is within normal limits.  There is a plantar calcaneal spur.  If clinical symptoms persist then consider follow-up imaging.     ASSESSMENT AND PLAN:   Diagnoses and all orders for this visit:    Plantar fasciitis of right foot    Gastrocnemius equinus of right lower extremity    Right foot pain    Acute right ankle pain  -     XR ANKLE (MIN 3 VIEWS), RIGHT (CPT=73610); Future        Plan:   -Patient was seen and evaluated today in clinic.  Chart history reviewed.    Discussed clinical exam findings with patient.  She does  have rear foot pain to the ankle and subtalar joint.  Patient is also continuing to have discomfort to the area of the origin of the plantar fascia.  Baseline radiographs of the right ankle were obtained today with no osseous abnormalities noted.  See above for official impression.    Discussed possible injection in the ankle joint today as this appears to be the most symptomatic.  Patient deferred.  Discussed other options.  Patient does have a figure 8 ankle brace.  Recommend utilizing for the time being while at work    Patient can also take OTC anti-inflammatories as needed    Recommend patient continue with formal physical therapy at this time.    Recommend patient trying to decrease work hours if possible, as she states that the pain worsens the longer she is on her feet currently.    Recommend continued use of supportive shoe gear and inserts at all times, avoiding barefoot walking.  Patient should also avoid activities that elicits pain.    If symptoms persist, will consider advanced imaging    -All of the patient's questions and concerns were addressed.  They indicated their understanding of these issues and agrees to the plan.    Time spent reviewing pertinent information from patient's chart, reviewing any pertinent imaging, obtaining history and physical exam, discussing and mutually agreeing on a treatment plan, and documenting encounter: 30 minutes    RTC 4 weeks    Jacinto Biggs DPM        12/10/2024    [facility]  [id number] [address] [address]   [email or web address]            [employer] speech recognition software was used to prepare this note.  Errors in word recognition may occur.  Please contact me with any questions/concerns with this note.

## 2024-12-16 ENCOUNTER — APPOINTMENT (OUTPATIENT)
Dept: PHYSICAL THERAPY | Age: 39
End: 2024-12-16
Attending: PODIATRIST
Payer: COMMERCIAL

## 2024-12-16 DIAGNOSIS — G43.109 MIGRAINE WITH AURA AND WITHOUT STATUS MIGRAINOSUS, NOT INTRACTABLE: ICD-10-CM

## 2024-12-16 RX ORDER — RIZATRIPTAN BENZOATE 10 MG/1
TABLET ORAL
Qty: 10 TABLET | Refills: 0 | Status: SHIPPED | OUTPATIENT
Start: 2024-12-16

## 2024-12-18 ENCOUNTER — OFFICE VISIT (OUTPATIENT)
Dept: PHYSICAL THERAPY | Age: 39
End: 2024-12-18
Attending: PODIATRIST
Payer: COMMERCIAL

## 2024-12-18 PROCEDURE — 97110 THERAPEUTIC EXERCISES: CPT

## 2024-12-18 PROCEDURE — 97140 MANUAL THERAPY 1/> REGIONS: CPT

## 2024-12-19 NOTE — PROGRESS NOTES
Dx: Right Sided Plantar Fasciitis         Authorized # of Visits:  NA         Next MD visit: none scheduled  Fall Risk: standard         Precautions: n/a           Goal Number: 8    Subjective: Foot is very sore.  Will be taking a week off work in new year with increased emphasis on PT and rest to relive the issue.      Objective: Treatment progressed per treatment log.      Date:11/19/2024 TX#: 2/NA Date:12/4/2024  TX#: 3/   Date:12/18/2024  TX#: 4/ Date:               TX#: 5/ Date:               TX#: 6/ Date:               TX#: 7/ Date:               TX#: 8/   VAS 3-4/10 VAS 6/10 VAS 6/10 VAS /10 VAS /10 VAS /10 VAS /10   TherEx (25 Min) TherEx (25 Min) TherEx (10Min)       Upright bike L5 x 6 minutes FHL stretch 30 sec x 5 FHL stretch 30 sec x 5       Prostretch 30 sec x 5 FWD leans in doorway x 20 Toe curl ankle pumps x 20       FHL stretch 30 sec x 5  Prostretch 30 sec x 5       Talar doming x 20                                    Manual PT (15 Min) Manual PT (15 Min) Manual PT (25 Min)       Posterior talocrual glide and distraction Posterior talocrual glide and distraction Posterior talocrual glide and distraction         DTM to plantar fascia x 20 min                  Assessment: The patient responded well to today's intervention.  Increased mobility with treatment today.  1st MTP extension limiting factor.        Goals (to be met in 8 visits):    1. Improve upon LEFS assessment > 11% from INE to DC.  2. Patient will be aware of postural limitations and be able to correct them independently.    3. Patient will have an increase in foot and ankle mobility to equal the opposite side in order to return to more normalized gait and funciton.    4. Patient will have an increase in foot and ankle strength to assist with returning to more normalzied gait  5. Patient will demonstrate an increase in MLT of the bilateral LEs in order to return to more normalized gait.     Plan: Assess response to today's treatment and  progress as tolerated.      Charges: TherEx 2 (25 min); Manual PT 1 (15 min)      Total Timed Treatment: 40 min  Total Treatment Time: 40 min

## 2024-12-31 ENCOUNTER — APPOINTMENT (OUTPATIENT)
Dept: PHYSICAL THERAPY | Age: 39
End: 2024-12-31
Attending: PODIATRIST
Payer: COMMERCIAL

## 2025-01-06 ENCOUNTER — OFFICE VISIT (OUTPATIENT)
Dept: PHYSICAL THERAPY | Age: 40
End: 2025-01-06
Attending: PODIATRIST
Payer: COMMERCIAL

## 2025-01-06 PROCEDURE — 97110 THERAPEUTIC EXERCISES: CPT

## 2025-01-06 PROCEDURE — 97140 MANUAL THERAPY 1/> REGIONS: CPT

## 2025-01-06 NOTE — PROGRESS NOTES
Dx: Right Sided Plantar Fasciitis         Authorized # of Visits:  NA         Next MD visit: none scheduled  Fall Risk: standard         Precautions: n/a           Goal Number: 8    Subjective: Off work this week. Notes increased lateral ankle soreness, not so much foot pain.      Objective: Treatment progressed per treatment log.      Date:11/19/2024 TX#: 2/NA Date:12/4/2024  TX#: 3/   Date:12/18/2024  TX#: 4/ Date:1/6/2025  TX#: 5/ Date:               TX#: 6/ Date:               TX#: 7/ Date:               TX#: 8/   VAS 3-4/10 VAS 6/10 VAS 6/10 VAS 4/10 VAS /10 VAS /10 VAS /10   TherEx (25 Min) TherEx (25 Min) TherEx (10Min) TherEx (25 Min)      Upright bike L5 x 6 minutes FHL stretch 30 sec x 5 FHL stretch 30 sec x 5 FHL stretch 30 sec x 5      Prostretch 30 sec x 5 FWD leans in doorway x 20 Toe curl ankle pumps x 20 Prostretch 30 sec x 5      FHL stretch 30 sec x 5  Prostretch 30 sec x 5 Talar doming x 20      Talar doming x 20   BAPS board L2 x 20 CW/CCW/AP/ML                                 Manual PT (15 Min) Manual PT (15 Min) Manual PT (25 Min) Manual PT (25 Min)      Posterior talocrual glide and distraction Posterior talocrual glide and distraction Posterior talocrual glide and distraction Posterior talocrual glide and distraction        DTM to plantar fascia x 20 min DTM to plantar fascia x 20 min                 Assessment: The patient responded well to today's intervention.  Increased mobility with treatment today.  Improved 1st MTP extension.        Goals (to be met in 8 visits):    1. Improve upon LEFS assessment > 11% from INE to DC.  2. Patient will be aware of postural limitations and be able to correct them independently.    3. Patient will have an increase in foot and ankle mobility to equal the opposite side in order to return to more normalized gait and funciton.    4. Patient will have an increase in foot and ankle strength to assist with returning to more normalzied gait  5. Patient will  demonstrate an increase in MLT of the bilateral LEs in order to return to more normalized gait.     Plan: Assess response to today's treatment and progress as tolerated.      Charges: TherEx 2 (25 min); Manual PT 1 (15 min)      Total Timed Treatment: 40 min  Total Treatment Time: 40 min

## 2025-01-08 ENCOUNTER — OFFICE VISIT (OUTPATIENT)
Dept: PHYSICAL THERAPY | Age: 40
End: 2025-01-08
Attending: PODIATRIST
Payer: COMMERCIAL

## 2025-01-08 PROCEDURE — 97140 MANUAL THERAPY 1/> REGIONS: CPT

## 2025-01-08 PROCEDURE — 97110 THERAPEUTIC EXERCISES: CPT

## 2025-01-08 NOTE — PROGRESS NOTES
Dx: Right Sided Plantar Fasciitis         Authorized # of Visits:  NA         Next MD visit: none scheduled  Fall Risk: standard         Precautions: n/a           Goal Number: 8    Subjective: Off work this week. More posterior ankle pain over foot pain in general.      Objective: Treatment progressed per treatment log.      Date:11/19/2024 TX#: 2/NA Date:12/4/2024  TX#: 3/   Date:12/18/2024  TX#: 4/ Date:1/6/2025  TX#: 5/ Date:1/8/2025  TX#: 6/ Date:               TX#: 7/ Date:               TX#: 8/   VAS 3-4/10 VAS 6/10 VAS 6/10 VAS 4/10 VAS 3/10 VAS /10 VAS /10   TherEx (25 Min) TherEx (25 Min) TherEx (10Min) TherEx (25 Min) TherEx (25 Min)     Upright bike L5 x 6 minutes FHL stretch 30 sec x 5 FHL stretch 30 sec x 5 FHL stretch 30 sec x 5 FHL stretch 30 sec x 5     Prostretch 30 sec x 5 FWD leans in doorway x 20 Toe curl ankle pumps x 20 Prostretch 30 sec x 5 Prostretch 30 sec x 5     FHL stretch 30 sec x 5  Prostretch 30 sec x 5 Talar doming x 20 Talar doming x 20     Talar doming x 20   BAPS board L2 x 20 CW/CCW/AP/ML WB Squats AP x 20                                Manual PT (15 Min) Manual PT (15 Min) Manual PT (25 Min) Manual PT (25 Min) Manual PT (25 Min)     Posterior talocrual glide and distraction Posterior talocrual glide and distraction Posterior talocrual glide and distraction Posterior talocrual glide and distraction Posterior talocrual glide and distraction       DTM to plantar fascia x 20 min DTM to plantar fascia x 20 min DTM to plantar fascia x 20 min                Assessment: The patient responded well to today's intervention.  Increased forefoot mobility with less pain to palpation.        Goals (to be met in 8 visits):    1. Improve upon LEFS assessment > 11% from INE to DC.  2. Patient will be aware of postural limitations and be able to correct them independently.    3. Patient will have an increase in foot and ankle mobility to equal the opposite side in order to return to more  normalized gait and funciton.    4. Patient will have an increase in foot and ankle strength to assist with returning to more normalzied gait  5. Patient will demonstrate an increase in MLT of the bilateral LEs in order to return to more normalized gait.     Plan: Assess response to today's treatment and progress as tolerated.      Charges: TherEx 2 (25 min); Manual PT 1 (15 min)      Total Timed Treatment: 40 min  Total Treatment Time: 40 min

## 2025-01-14 ENCOUNTER — OFFICE VISIT (OUTPATIENT)
Facility: LOCATION | Age: 40
End: 2025-01-14
Payer: COMMERCIAL

## 2025-01-14 ENCOUNTER — APPOINTMENT (OUTPATIENT)
Dept: PHYSICAL THERAPY | Age: 40
End: 2025-01-14
Attending: PODIATRIST
Payer: COMMERCIAL

## 2025-01-14 DIAGNOSIS — M79.671 PAIN OF RIGHT HEEL: ICD-10-CM

## 2025-01-14 DIAGNOSIS — M72.2 PLANTAR FASCIITIS OF RIGHT FOOT: ICD-10-CM

## 2025-01-14 DIAGNOSIS — M62.461 GASTROCNEMIUS EQUINUS OF RIGHT LOWER EXTREMITY: ICD-10-CM

## 2025-01-14 DIAGNOSIS — M76.61 ACHILLES TENDINITIS OF RIGHT LOWER EXTREMITY: Primary | ICD-10-CM

## 2025-01-14 DIAGNOSIS — M25.571 ACUTE RIGHT ANKLE PAIN: ICD-10-CM

## 2025-01-14 PROCEDURE — 99213 OFFICE O/P EST LOW 20 MIN: CPT | Performed by: PODIATRIST

## 2025-01-14 NOTE — PROGRESS NOTES
[facility]  Progress Note      Caryn Adele Behnke is a 39 year old female.   Chief Complaint   Patient presents with    Foot Pain     Patient is here to follow up on right heel pain. Heel pain is better, she did take a week off of work which helped relieve  the pain. She continues to have pain in the ankle, rates it 4/10. Does have numbness/tingling underneath digits 3 and 4.         HPI:     Patient is a pleasant 39-year-old female who is returning clinic today for recheck of right plantar fasciitis.  Patient states she is doing well overall and has noticed improvements in her plan fasciitis symptoms.  She has been in physical therapy for this and feels that this has been beneficial.  Patient also recently took a week off of work, which also helped relieve some of the pain.  Patient does have concerns of ongoing pain to the back of the right ankle, which has progressively been worsening.  Currently rates the pain today 4/10, but it does worsen the longer she is on her feet.  Does not recall any recent injuries.  She is ambulating in [employer] sandals today.  No other concerns at this time.      Allergies: Ibuprofen and Lactose   Current Outpatient Medications   Medication Sig Dispense Refill    Amphetamine-Dextroamphet ER (ADDERALL XR) 20 MG Oral Capsule SR 24 Hr Take 1 capsule (20 mg total) by mouth daily. 30 capsule 0    [START ON 2/11/2025] Amphetamine-Dextroamphet ER (ADDERALL XR) 20 MG Oral Capsule SR 24 Hr Take 1 capsule (20 mg total) by mouth daily. 30 capsule 0    [START ON 3/13/2025] Amphetamine-Dextroamphet ER (ADDERALL XR) 20 MG Oral Capsule SR 24 Hr Take 1 capsule (20 mg total) by mouth daily. 30 capsule 0    Rizatriptan Benzoate 10 MG Oral Tab take one tablet by mouth one time daily as needed for migraine. may repeat in 2 hours if needed. Max of 2 tablets in 24 hours 10 tablet 0    amphetamine-dextroamphetamine 20 MG Oral Tab Take 1 tablet (20 mg total) by mouth daily.      JIM CARLIN 1.5/30  1.5-30 MG-MCG Oral Tab TAKE ONE TABLET BY MOUTH ONCE DAILY 84 tablet 1    Rimegepant Sulfate (NURTEC) 75 MG Oral Tablet Dispersible Take 75 mg by mouth as needed. Take one tablet at onset of migraine.  Maximum dose in 24 hours is 1 tablet (75mg). 4 tablet 0    cyclobenzaprine 5 MG Oral Tab Take 1-2 tablets (5-10 mg total) by mouth 3 (three) times daily as needed for Muscle spasms. Do not take while driving. Do not drink alcohol with use 60 tablet 0      Past Medical History:    Lipid screening    Unspecified vitamin D deficiency      Past Surgical History:   Procedure Laterality Date    Appendectomy  1996    Appendectomy        Family History   Problem Relation Age of Onset    Other (Bipolar disorder) Father     Other (Depression) Father     Diabetes Mother     Other (Depression) Mother     Other (Hypothyroidism) Mother     Breast Cancer Maternal Grandmother     Breast Cancer Paternal Grandmother 50    Cancer Paternal Grandmother     Heart Disease Paternal Grandmother     Other (Other) Paternal Grandmother     Other (HTN) Paternal Grandmother     Other (Depression) Brother     Schizophrenia Brother     Other (proteus syndrome) Brother       Social History     Socioeconomic History    Marital status:    Tobacco Use    Smoking status: Former     Current packs/day: 0.00     Average packs/day: 1 pack/day for 18.0 years (18.0 ttl pk-yrs)     Types: Cigarettes     Start date:      Quit date: 2013     Years since quittin.8    Smokeless tobacco: Never    Tobacco comments:     over 1 pk. daily   Vaping Use    Vaping status: Never Used   Substance and Sexual Activity    Alcohol use: Yes     Alcohol/week: 1.0 standard drink of alcohol     Types: 1 Glasses of wine per week     Comment: \"rare\"    Drug use: No    Sexual activity: Not Currently     Partners: Male     Birth control/protection: Abstinence   Other Topics Concern    Caffeine Concern Yes     Comment: soda    Exercise Yes     Comment:  \"occasionally\"    Seat Belt Yes           REVIEW OF SYSTEMS:     10 point ROS completed and was negative unless stated in HPI.      EXAM:     Right lower extremity focused exam:  GENERAL: well developed, well nourished, in no apparent distress  EXTREMITIES:  1. Integument: Skin appears moist, warm, and supple with positive hair growth. There are no color changes. No open lesions. No macerations. No Hyperkeratotic lesions.   2. Vascular: Dorsalis pedis 2/4 bilateral and posterior tibial pulses 2/4 bilateral, capillary refill normal.  3. Neurological: Gross sensation intact via light touch bilaterally.  Normal sharp/dull sensation  4. Musculoskeletal: All muscle groups are graded 5/5 in the foot and ankle with some discomfort elicited with plantarflexion against resistance.  Patient does have pain along the mid substance of the Achilles tendon.  There is no palpable dell, no increased thickness of the Achilles tendon, negative Dumont test, and no current signs of Achilles tendon rupture.  Patient does have mild continued pain with palpation to the plantar medial aspect of the right heel near the origin of plantar fascia.  No pain with side-to-side heel squeeze.  No pain to the posterior heel or along the course of the Achilles tendon.  No pain with palpation to posterior tibial tendon or peroneal tendons.  Some discomfort elicited with passive ROM of ankle and STJ.  Improved pain with palpation within the medial and anterior ankle gutters.  Mild discomfort with direct palpation to lateral STJ.  Patient does have decreased ankle joint dorsiflexion with the knee extended, which slightly improves with the knee flexed consistent with an equinus deformity.     PROCEDURE:  XR ANKLE (MIN 3 VIEWS), RIGHT (CPT=73610)      TECHNIQUE:  Three views were obtained.      COMPARISON:  None.      INDICATIONS:  M25.571 Acute right ankle pain      PATIENT STATED HISTORY: (As transcribed by Technologist)  Podiatry evaluation. Diffuse  ankle pain for several months, plantar heel pain.          FINDINGS:  No acute fracture or dislocation is seen.  The ankle mortise is within normal limits.  There is a plantar calcaneal spur.  If clinical symptoms persist then consider follow-up imaging.       ASSESSMENT AND PLAN:   Diagnoses and all orders for this visit:    Achilles tendinitis of right lower extremity    Plantar fasciitis of right foot    Acute right ankle pain    Gastrocnemius equinus of right lower extremity    Pain of right heel        Plan:   -Patient was seen and evaluated today in clinic.  Chart history reviewed.    Patient overall improved in regards to plantar fasciitis of the right lower extremity.  Most of patient's subjective and objective pain is along the mid substance of the Achilles tendon of the right lower extremity.  No current signs of Achilles tendon rupture.  This is consistent with Achilles tendinitis.    Patient seen and evaluated today in clinic.  All relevant clinic notes and imaging were reviewed prior to visit.  Discussed findings, which are consistent with Achilles tendinitis of the right lower extremity.  Discussed conservative management and potential for formal PT.  Discussed possible oral steroids if patient is not diabetic, otherwise use of NSAIDS if no history of GERD or stomach upset.  Recommend cryotherapy/icing.  Discussed the importance of rest and the need for immobilization.  Recommend use of OTC orthotics in the treatment and future prevention of tendinitis.  Supportive shoe gear recommendation was also given to patient today.  Recommend use of compression stockings vs. ACE wrap to control edema/swelling.    I am recommending patient continue with formal physical therapy at this time, focusing more on the right Achilles tendon for rehab.  She should also continue with daily exercises that she has learned at physical therapy, avoiding any exercises that increases pain    Offered anti-inflammatories to the  patient today, which she deferred.    Discussed potential need to order MRI if conservative management fails to resolve symptoms out of concern for underlying tear.  Discussed potential need for surgical intervention if conservative management fails to resolve symptoms.    -All of the patient's questions and concerns were addressed.  They indicated their understanding of these issues and agrees to the plan.    Time spent reviewing pertinent information from patient's chart, reviewing any pertinent imaging, obtaining history and physical exam, discussing and mutually agreeing on a treatment plan, and documenting encounter: 25 minutes    RTC 4-6 weeks    Jacinto Biggs DPM        1/14/2025    [facility]  [address] [address] [address]   [email or web address]            [employer] speech recognition software was used to prepare this note.  Errors in word recognition may occur.  Please contact me with any questions/concerns with this note.

## 2025-01-17 DIAGNOSIS — F98.8 ATTENTION DEFICIT DISORDER (ADD) WITHOUT HYPERACTIVITY: ICD-10-CM

## 2025-01-20 ENCOUNTER — APPOINTMENT (OUTPATIENT)
Dept: PHYSICAL THERAPY | Age: 40
End: 2025-01-20
Attending: PODIATRIST
Payer: COMMERCIAL

## 2025-01-20 RX ORDER — DEXTROAMPHETAMINE SACCHARATE, AMPHETAMINE ASPARTATE MONOHYDRATE, DEXTROAMPHETAMINE SULFATE AND AMPHETAMINE SULFATE 5; 5; 5; 5 MG/1; MG/1; MG/1; MG/1
20 CAPSULE, EXTENDED RELEASE ORAL DAILY
Qty: 30 CAPSULE | Refills: 0 | OUTPATIENT
Start: 2025-01-20 | End: 2025-02-19

## 2025-01-20 NOTE — TELEPHONE ENCOUNTER
Signed 2 weeks ago (1/6/2025):   Amphetamine-Dextroamphet ER (ADDERALL XR) 20 MG Oral Capsule SR 24 Hr   Sig: Take 1 capsule (20 mg total) by mouth daily.   Disp: 30 capsule    Refills: 0   End date: 2/11/2025   Signed by: Lily Nguyễn APRN   Encounter Details      Signed 2 weeks ago (1/6/2025):   Amphetamine-Dextroamphet ER (ADDERALL XR) 20 MG Oral Capsule SR 24 Hr   Sig: Take 1 capsule (20 mg total) by mouth daily.   Disp: 30 capsule    Refills: 0   End date: 3/13/2025   Signed by: Lily Nguyễn APRN   Encounter Details      Signed 2 weeks ago (1/6/2025):   Amphetamine-Dextroamphet ER (ADDERALL XR) 20 MG Oral Capsule SR 24 Hr   Sig: Take 1 capsule (20 mg total) by mouth daily.   Disp: 30 capsule    Refills: 0   End date: 4/12/2025   Signed by: Lily Nguyễn APRN   Encounter Details

## 2025-01-21 ENCOUNTER — TELEPHONE (OUTPATIENT)
Dept: PHYSICAL THERAPY | Facility: HOSPITAL | Age: 40
End: 2025-01-21

## 2025-01-22 ENCOUNTER — TELEPHONE (OUTPATIENT)
Dept: PHYSICAL THERAPY | Facility: HOSPITAL | Age: 40
End: 2025-01-22

## 2025-01-28 ENCOUNTER — TELEPHONE (OUTPATIENT)
Dept: FAMILY MEDICINE CLINIC | Facility: CLINIC | Age: 40
End: 2025-01-28

## 2025-01-28 DIAGNOSIS — Z00.00 ROUTINE GENERAL MEDICAL EXAMINATION AT A HEALTH CARE FACILITY: Primary | ICD-10-CM

## 2025-01-28 DIAGNOSIS — Z00.00 ROUTINE PHYSICAL EXAMINATION: ICD-10-CM

## 2025-02-04 ENCOUNTER — LAB ENCOUNTER (OUTPATIENT)
Dept: LAB | Age: 40
End: 2025-02-04
Attending: FAMILY MEDICINE
Payer: COMMERCIAL

## 2025-02-04 ENCOUNTER — OFFICE VISIT (OUTPATIENT)
Dept: FAMILY MEDICINE CLINIC | Facility: CLINIC | Age: 40
End: 2025-02-04
Payer: COMMERCIAL

## 2025-02-04 ENCOUNTER — OFFICE VISIT (OUTPATIENT)
Dept: PHYSICAL THERAPY | Age: 40
End: 2025-02-04
Attending: PODIATRIST
Payer: COMMERCIAL

## 2025-02-04 VITALS
OXYGEN SATURATION: 97 % | WEIGHT: 193 LBS | HEART RATE: 74 BPM | SYSTOLIC BLOOD PRESSURE: 122 MMHG | HEIGHT: 63 IN | DIASTOLIC BLOOD PRESSURE: 78 MMHG | BODY MASS INDEX: 34.2 KG/M2

## 2025-02-04 DIAGNOSIS — R20.2 PARESTHESIAS: ICD-10-CM

## 2025-02-04 DIAGNOSIS — Z00.00 ROUTINE PHYSICAL EXAMINATION: ICD-10-CM

## 2025-02-04 DIAGNOSIS — F32.81 PMDD (PREMENSTRUAL DYSPHORIC DISORDER): ICD-10-CM

## 2025-02-04 DIAGNOSIS — Z00.00 ROUTINE GENERAL MEDICAL EXAMINATION AT A HEALTH CARE FACILITY: ICD-10-CM

## 2025-02-04 DIAGNOSIS — M25.50 POLYARTHRALGIA: ICD-10-CM

## 2025-02-04 DIAGNOSIS — R63.1 POLYDIPSIA: ICD-10-CM

## 2025-02-04 DIAGNOSIS — F98.8 ATTENTION DEFICIT DISORDER (ADD) WITHOUT HYPERACTIVITY: ICD-10-CM

## 2025-02-04 DIAGNOSIS — G43.E09 CHRONIC MIGRAINE WITH AURA WITHOUT STATUS MIGRAINOSUS, NOT INTRACTABLE: ICD-10-CM

## 2025-02-04 DIAGNOSIS — Z00.00 ROUTINE PHYSICAL EXAMINATION: Primary | ICD-10-CM

## 2025-02-04 LAB
ALBUMIN SERPL-MCNC: 4.6 G/DL (ref 3.2–4.8)
ALBUMIN/GLOB SERPL: 1.7 {RATIO} (ref 1–2)
ALP LIVER SERPL-CCNC: 45 U/L
ALT SERPL-CCNC: 12 U/L
ANION GAP SERPL CALC-SCNC: 8 MMOL/L (ref 0–18)
AST SERPL-CCNC: 19 U/L (ref ?–34)
BASOPHILS # BLD AUTO: 0.03 X10(3) UL (ref 0–0.2)
BASOPHILS NFR BLD AUTO: 0.4 %
BILIRUB SERPL-MCNC: 0.5 MG/DL (ref 0.3–1.2)
BUN BLD-MCNC: 11 MG/DL (ref 9–23)
CALCIUM BLD-MCNC: 9.1 MG/DL (ref 8.7–10.6)
CHLORIDE SERPL-SCNC: 105 MMOL/L (ref 98–112)
CHOLEST SERPL-MCNC: 181 MG/DL (ref ?–200)
CK SERPL-CCNC: 135 U/L
CO2 SERPL-SCNC: 24 MMOL/L (ref 21–32)
CREAT BLD-MCNC: 0.77 MG/DL
CRP SERPL-MCNC: <0.4 MG/DL (ref ?–0.5)
EGFRCR SERPLBLD CKD-EPI 2021: 101 ML/MIN/1.73M2 (ref 60–?)
EOSINOPHIL # BLD AUTO: 0.07 X10(3) UL (ref 0–0.7)
EOSINOPHIL NFR BLD AUTO: 0.9 %
ERYTHROCYTE [DISTWIDTH] IN BLOOD BY AUTOMATED COUNT: 12.3 %
ERYTHROCYTE [SEDIMENTATION RATE] IN BLOOD: 17 MM/HR
EST. AVERAGE GLUCOSE BLD GHB EST-MCNC: 100 MG/DL (ref 68–126)
FASTING PATIENT LIPID ANSWER: YES
FASTING STATUS PATIENT QL REPORTED: YES
GLOBULIN PLAS-MCNC: 2.7 G/DL (ref 2–3.5)
GLUCOSE BLD-MCNC: 77 MG/DL (ref 70–99)
HBA1C MFR BLD: 5.1 % (ref ?–5.7)
HCT VFR BLD AUTO: 39.3 %
HDLC SERPL-MCNC: 70 MG/DL (ref 40–59)
HGB BLD-MCNC: 13.5 G/DL
IMM GRANULOCYTES # BLD AUTO: 0.02 X10(3) UL (ref 0–1)
IMM GRANULOCYTES NFR BLD: 0.3 %
LDLC SERPL CALC-MCNC: 99 MG/DL (ref ?–100)
LYMPHOCYTES # BLD AUTO: 2.12 X10(3) UL (ref 1–4)
LYMPHOCYTES NFR BLD AUTO: 27.5 %
MCH RBC QN AUTO: 32.7 PG (ref 26–34)
MCHC RBC AUTO-ENTMCNC: 34.4 G/DL (ref 31–37)
MCV RBC AUTO: 95.2 FL
MONOCYTES # BLD AUTO: 0.6 X10(3) UL (ref 0.1–1)
MONOCYTES NFR BLD AUTO: 7.8 %
NEUTROPHILS # BLD AUTO: 4.86 X10 (3) UL (ref 1.5–7.7)
NEUTROPHILS # BLD AUTO: 4.86 X10(3) UL (ref 1.5–7.7)
NEUTROPHILS NFR BLD AUTO: 63.1 %
NONHDLC SERPL-MCNC: 111 MG/DL (ref ?–130)
OSMOLALITY SERPL CALC.SUM OF ELEC: 282 MOSM/KG (ref 275–295)
PLATELET # BLD AUTO: 279 10(3)UL (ref 150–450)
POTASSIUM SERPL-SCNC: 3.9 MMOL/L (ref 3.5–5.1)
PROT SERPL-MCNC: 7.3 G/DL (ref 5.7–8.2)
RBC # BLD AUTO: 4.13 X10(6)UL
SODIUM SERPL-SCNC: 137 MMOL/L (ref 136–145)
TRIGL SERPL-MCNC: 60 MG/DL (ref 30–149)
TSI SER-ACNC: 1.05 UIU/ML (ref 0.55–4.78)
URATE SERPL-MCNC: 4.4 MG/DL
VLDLC SERPL CALC-MCNC: 10 MG/DL (ref 0–30)
WBC # BLD AUTO: 7.7 X10(3) UL (ref 4–11)

## 2025-02-04 PROCEDURE — 84550 ASSAY OF BLOOD/URIC ACID: CPT

## 2025-02-04 PROCEDURE — 86200 CCP ANTIBODY: CPT

## 2025-02-04 PROCEDURE — 86140 C-REACTIVE PROTEIN: CPT

## 2025-02-04 PROCEDURE — 36415 COLL VENOUS BLD VENIPUNCTURE: CPT

## 2025-02-04 PROCEDURE — 86225 DNA ANTIBODY NATIVE: CPT

## 2025-02-04 PROCEDURE — 86038 ANTINUCLEAR ANTIBODIES: CPT

## 2025-02-04 PROCEDURE — 85025 COMPLETE CBC W/AUTO DIFF WBC: CPT

## 2025-02-04 PROCEDURE — 99395 PREV VISIT EST AGE 18-39: CPT | Performed by: FAMILY MEDICINE

## 2025-02-04 PROCEDURE — 97140 MANUAL THERAPY 1/> REGIONS: CPT

## 2025-02-04 PROCEDURE — 81374 HLA I TYPING 1 ANTIGEN LR: CPT

## 2025-02-04 PROCEDURE — 85652 RBC SED RATE AUTOMATED: CPT

## 2025-02-04 PROCEDURE — 84443 ASSAY THYROID STIM HORMONE: CPT

## 2025-02-04 PROCEDURE — 83036 HEMOGLOBIN GLYCOSYLATED A1C: CPT

## 2025-02-04 PROCEDURE — 82550 ASSAY OF CK (CPK): CPT

## 2025-02-04 PROCEDURE — 80061 LIPID PANEL: CPT

## 2025-02-04 PROCEDURE — 86431 RHEUMATOID FACTOR QUANT: CPT

## 2025-02-04 PROCEDURE — 97110 THERAPEUTIC EXERCISES: CPT

## 2025-02-04 PROCEDURE — 80053 COMPREHEN METABOLIC PANEL: CPT

## 2025-02-04 RX ORDER — LEVONORGESTREL AND ETHINYL ESTRADIOL 0.15-0.03
1 KIT ORAL DAILY
Qty: 91 TABLET | Refills: 3 | Status: SHIPPED | OUTPATIENT
Start: 2025-02-04 | End: 2026-02-04

## 2025-02-04 NOTE — PROGRESS NOTES
Dx: Right Sided Plantar Fasciitis         Authorized # of Visits:  NA         Next MD visit: none scheduled  Fall Risk: standard         Precautions: n/a           Goal Number: 8    Subjective: Notes some increased anterior shin pressure pain.      Objective: Treatment progressed per treatment log.      Date:11/19/2024 TX#: 2/NA Date:12/4/2024  TX#: 3/   Date:12/18/2024  TX#: 4/ Date:1/6/2025  TX#: 5/ Date:1/8/2025  TX#: 6/ Date:2/4/2025  TX#: 7/ Date:               TX#: 8/   VAS 3-4/10 VAS 6/10 VAS 6/10 VAS 4/10 VAS 3/10 VAS /10 VAS /10   TherEx (25 Min) TherEx (25 Min) TherEx (10Min) TherEx (25 Min) TherEx (25 Min) TherEx (25 Min)    Upright bike L5 x 6 minutes FHL stretch 30 sec x 5 FHL stretch 30 sec x 5 FHL stretch 30 sec x 5 FHL stretch 30 sec x 5 FHL stretch 30 sec x 5    Prostretch 30 sec x 5 FWD leans in doorway x 20 Toe curl ankle pumps x 20 Prostretch 30 sec x 5 Prostretch 30 sec x 5 Prostretch 30 sec x 5    FHL stretch 30 sec x 5  Prostretch 30 sec x 5 Talar doming x 20 Talar doming x 20 WB Squats AP x 20    Talar doming x 20   BAPS board L2 x 20 CW/CCW/AP/ML WB Squats AP x 20                                Manual PT (15 Min) Manual PT (15 Min) Manual PT (25 Min) Manual PT (25 Min) Manual PT (25 Min) Manual PT (25 Min)    Posterior talocrual glide and distraction Posterior talocrual glide and distraction Posterior talocrual glide and distraction Posterior talocrual glide and distraction Posterior talocrual glide and distraction Posterior talocrual glide and distraction      DTM to plantar fascia x 20 min DTM to plantar fascia x 20 min DTM to plantar fascia x 20 min DTM to plantar fascia x 20 min               Assessment: The patient responded well to today's intervention.  Possible anterior tibialis secondary to calf tightness for when she's on her feet at work.        Goals (to be met in 8 visits):    1. Improve upon LEFS assessment > 11% from INE to DC.  2. Patient will be aware of postural limitations  and be able to correct them independently.    3. Patient will have an increase in foot and ankle mobility to equal the opposite side in order to return to more normalized gait and funciton.    4. Patient will have an increase in foot and ankle strength to assist with returning to more normalzied gait  5. Patient will demonstrate an increase in MLT of the bilateral LEs in order to return to more normalized gait.     Plan: Await further recommendations regarding care for this patient.       Charges: TherEx 2 (25 min); Manual PT 1 (15 min)      Total Timed Treatment: 40 min  Total Treatment Time: 40 min

## 2025-02-04 NOTE — PROGRESS NOTES
Caryn Adele Behnke is a 39 year old female.    CC:    Chief Complaint   Patient presents with    Physical     Reviewed Preventative/Wellness form with patient.  Physical and blood work        HPI:  Wellness     Exercise: Yes  Drinks water: Yes  Eat variety of fruits & vegetables, lean meats: Yes  Issues with sleep: No  Regular dental exams: Yes  Change in size/consistency of stool: No  Change in appearance of mole: No    Gyne Hx  OB History    Para Term  AB Living   1 1 1 0 0 1   SAB IAB Ectopic Multiple Live Births   0 0 0 0 1     Patient's last menstrual period was 2025 (exact date).  Period: regular   Sexual activity:monogamy   Contraception: ocp   Previous pap: normal     CAGE Alcohol Screening       2025     1:36 PM   CAGE Flowsheet   Have you ever felt you should Cut down on your drinking? 0   Have people Annoyed you by criticizing your drinking? 0   Have you ever felt bad or Guilty about your drinking? 0   Have you ever had a drink first thing in the morning to steady your nerves or to get rid of a hangover (Eye opener)? 0   Total Score: Item responses on the CAGE are scored 0 or 1, with a higher score an indication of alcohol 0   Total Score: Item responses on the CAGE are scored 0 or 1, with a higher score an indication of alcohol No Risk        Depression Screening (PHQ-2/PHQ-9): Over the LAST 2 WEEKS   Little interest or pleasure in doing things: Not at all    Feeling down, depressed, or hopeless: Not at all    PHQ-2 SCORE: 0          [place] Suicide Severity Rating Scale Screener   In what setting is the screener performed?: an office visit  1. Have you wished you were dead or wished you could go to sleep and not wake up? (past 30 days): No  2. Have you actually had any thoughts of killing yourself? (past 30 days): No  6. Have you ever done anything, started to do anything, or prepared to do anything to end your life? (lifetime): No  Score and Suggested Actions - Office Visit:  No Risk  Score and Intervention  Score and Suggested Actions - Office Visit: No Risk        Follow up     Mood issues - episodes of mood lability, panic sx   Always 2 weeks before menses   OCP has improved some   Adderall has helped some     Migraines - did not try emgality   States rizatriptan is working ok for now       Allergies:  Allergies[1]   Current Meds:  Medications Ordered Prior to Encounter[2]     History:  Past Medical History:    Lipid screening    Unspecified vitamin D deficiency      Past Surgical History:   Procedure Laterality Date    Appendectomy  1996    Appendectomy        Family History   Problem Relation Age of Onset    Other (Bipolar disorder) Father     Other (Depression) Father     Diabetes Mother     Other (Depression) Mother     Other (Hypothyroidism) Mother     Breast Cancer Maternal Grandmother     Breast Cancer Paternal Grandmother 50    Cancer Paternal Grandmother     Heart Disease Paternal Grandmother     Other (Other) Paternal Grandmother     Other (HTN) Paternal Grandmother     Other (Depression) Brother     Schizophrenia Brother     Other (proteus syndrome) Brother       Family Status   Relation Status    Fa Alive    Mo Alive    MGMA     PGMA     Bro Alive    Bro Alive      Social History     Socioeconomic History    Marital status:    Tobacco Use    Smoking status: Former     Current packs/day: 0.00     Average packs/day: 1 pack/day for 18.0 years (18.0 ttl pk-yrs)     Types: Cigarettes     Start date:      Quit date: 2013     Years since quittin.9    Smokeless tobacco: Never    Tobacco comments:     over 1 pk. daily   Vaping Use    Vaping status: Never Used   Substance and Sexual Activity    Alcohol use: Yes     Alcohol/week: 1.0 standard drink of alcohol     Types: 1 Glasses of wine per week     Comment: \"rare\"    Drug use: No    Sexual activity: Not Currently     Partners: Male     Birth control/protection: Abstinence   Other Topics  Concern    Caffeine Concern Yes     Comment: soda    Exercise Yes     Comment: \"occasionally\"    Seat Belt Yes            Immunization History   Administered Date(s) Administered    Covid-19 Vaccine Pfizer 30 mcg/0.3 ml 08/23/2021, 09/26/2021    TDAP 10/29/2010, 09/09/2019       Wt Readings from Last 6 Encounters:   02/04/25 193 lb (87.5 kg)   01/06/25 (P) 195 lb (88.5 kg)   11/16/24 190 lb (86.2 kg)   06/26/24 185 lb (83.9 kg)   04/26/24 177 lb (80.3 kg)   04/12/24 170 lb (77.1 kg)       BP Readings from Last 3 Encounters:   02/04/25 122/78   01/06/25 (P) 110/80   11/16/24 110/64       REVIEW OF SYSTEMS:   Review of Systems   Constitutional:  Negative for chills, fever and malaise/fatigue.   HENT:  Negative for congestion, ear pain and sore throat.    Eyes:  Negative for blurred vision, double vision and pain.   Respiratory:  Negative for cough, shortness of breath and wheezing.    Cardiovascular:  Positive for leg swelling. Negative for chest pain and palpitations.   Gastrointestinal:  Negative for abdominal pain, blood in stool, constipation, diarrhea, melena, nausea and vomiting.   Genitourinary:  Negative for dysuria, flank pain and frequency.   Musculoskeletal:  Positive for joint pain and myalgias. Negative for back pain.   Skin:  Positive for rash. Negative for itching.   Neurological:  Negative for dizziness, weakness and headaches.   Endo/Heme/Allergies:  Positive for polydipsia. Negative for environmental allergies. Does not bruise/bleed easily.   Psychiatric/Behavioral:  Negative for depression. The patient is not nervous/anxious and does not have insomnia.        EXAM:   /78   Pulse 74   Ht 5' 3\" (1.6 m)   Wt 193 lb (87.5 kg)   LMP 01/04/2025 (Exact Date)   SpO2 97%   BMI 34.19 kg/m²   Body mass index is 34.19 kg/m².  Patient's last menstrual period was 01/04/2025 (exact date).    Physical Exam  Constitutional:       General: She is not in acute distress.     Appearance: Normal appearance.    HENT:      Right Ear: Tympanic membrane normal.      Left Ear: Tympanic membrane normal.      Mouth/Throat:      Mouth: Mucous membranes are moist.      Pharynx: Oropharynx is clear. No posterior oropharyngeal erythema.   Eyes:      Extraocular Movements: Extraocular movements intact.      Conjunctiva/sclera: Conjunctivae normal.      Pupils: Pupils are equal, round, and reactive to light.   Cardiovascular:      Rate and Rhythm: Normal rate and regular rhythm.      Pulses: Normal pulses.      Heart sounds: Normal heart sounds.   Pulmonary:      Effort: Pulmonary effort is normal.      Breath sounds: Normal breath sounds. No wheezing or rhonchi.   Abdominal:      General: Abdomen is flat. Bowel sounds are normal.      Palpations: Abdomen is soft.      Tenderness: There is no abdominal tenderness. There is no guarding.   Musculoskeletal:         General: No swelling, tenderness, deformity or signs of injury.      Cervical back: Neck supple.   Lymphadenopathy:      Cervical: No cervical adenopathy.   Skin:     General: Skin is warm and dry.      Findings: No rash.   Neurological:      General: No focal deficit present.      Mental Status: She is alert and oriented to person, place, and time.      Motor: No weakness.   Psychiatric:         Attention and Perception: Attention normal.         Mood and Affect: Mood normal. Affect is flat.         Behavior: Behavior normal.         Thought Content: Thought content normal.                ASSESSMENT/PLAN:      1. Routine physical examination  - Hemoglobin A1C; Future    2. Attention deficit disorder (ADD) without hyperactivity  Stable     3. Chronic migraine with aura without status migrainosus, not intractable  Stable     4. PMDD (premenstrual dysphoric disorder)  - Levonorgest-Eth Estrad 91-Day 0.15-0.03 MG Oral Tab; Take 1 tablet by mouth daily.  Dispense: 91 tablet; Refill: 3  Rec trying extended cycle regimen to dec number of periods   Would benefit from SNRI maria a given  ADD but patient declines at this time    5. Polyarthralgia  - Connective Tissue Disease (JIGNA) Screen, Reflex Specific Antibody (Edward/Dowell); Future  - Rheumatoid Arthritis Factor; Future  - Uric Acid, Serum; Future  - Cyclic Citrullinate Peptide (CCP) antibodies; Future  - Sed Rate, Westergren (Automated); Future  - C-Reactive Protein; Future  - HLA B 27 Disease Association; Future  - CK (Creatine Kinase) (Not Creatinine) (E); Future    6. Paresthesias  - Connective Tissue Disease (JIGNA) Screen, Reflex Specific Antibody (Edward/Dowell); Future  - Rheumatoid Arthritis Factor; Future  - Uric Acid, Serum; Future  - Cyclic Citrullinate Peptide (CCP) antibodies; Future  - Sed Rate, Westergren (Automated); Future  - C-Reactive Protein; Future  - HLA B 27 Disease Association; Future  - CK (Creatine Kinase) (Not Creatinine) (E); Future    7. Polydipsia  - Hemoglobin A1C; Future      Health Maintenance   Topic Date Due    Annual Physical  01/30/2025    COVID-19 Vaccine (3 - 2024-25 season) 03/04/2025 (Originally 9/1/2024)    Influenza Vaccine (1) 06/30/2025 (Originally 10/1/2024)    Pap Smear  04/26/2027    DTaP,Tdap,and Td Vaccines (3 - Td or Tdap) 09/09/2029    Annual Depression Screening  Completed    Pneumococcal Vaccine: Birth to 50yrs  Aged Out    Meningococcal B Vaccine  Aged Out         Healthy diet and regular exercise discussed     Meds & Refills for this Visit:  Requested Prescriptions     Signed Prescriptions Disp Refills    Levonorgest-Eth Estrad 91-Day 0.15-0.03 MG Oral Tab 91 tablet 3     Sig: Take 1 tablet by mouth daily.         Imaging & Consults:  None    Return in about 6 months (around 8/4/2025).             [1]   Allergies  Allergen Reactions    Ibuprofen HIVES     \"CAPS\"    Lactose OTHER (SEE COMMENTS)     GI   [2]   Current Outpatient Medications on File Prior to Visit   Medication Sig Dispense Refill    Amphetamine-Dextroamphet ER (ADDERALL XR) 20 MG Oral Capsule SR 24 Hr Take 1 capsule (20 mg  total) by mouth daily. 30 capsule 0    [START ON 2/11/2025] Amphetamine-Dextroamphet ER (ADDERALL XR) 20 MG Oral Capsule SR 24 Hr Take 1 capsule (20 mg total) by mouth daily. 30 capsule 0    [START ON 3/13/2025] Amphetamine-Dextroamphet ER (ADDERALL XR) 20 MG Oral Capsule SR 24 Hr Take 1 capsule (20 mg total) by mouth daily. 30 capsule 0    Rizatriptan Benzoate 10 MG Oral Tab take one tablet by mouth one time daily as needed for migraine. may repeat in 2 hours if needed. Max of 2 tablets in 24 hours 10 tablet 0    amphetamine-dextroamphetamine 20 MG Oral Tab Take 1 tablet (20 mg total) by mouth daily.      cyclobenzaprine 5 MG Oral Tab Take 1-2 tablets (5-10 mg total) by mouth 3 (three) times daily as needed for Muscle spasms. Do not take while driving. Do not drink alcohol with use 60 tablet 0     No current facility-administered medications on file prior to visit.

## 2025-02-05 LAB
CCP IGG SERPL-ACNC: 0.9 U/ML (ref 0–6.9)
DSDNA IGG SERPL IA-ACNC: 0.7 IU/ML
ENA AB SER QL IA: <0.09 UG/L
ENA AB SER QL IA: NEGATIVE
RHEUMATOID FACT SERPL-ACNC: 4.9 IU/ML (ref ?–14)

## 2025-02-07 LAB
HLA B27 SCREENING: NEGATIVE
HLA B27 SCREENING: NEGATIVE

## 2025-02-13 ENCOUNTER — PATIENT MESSAGE (OUTPATIENT)
Dept: FAMILY MEDICINE CLINIC | Facility: CLINIC | Age: 40
End: 2025-02-13

## 2025-02-13 DIAGNOSIS — E66.811 CLASS 1 OBESITY DUE TO EXCESS CALORIES WITHOUT SERIOUS COMORBIDITY WITH BODY MASS INDEX (BMI) OF 34.0 TO 34.9 IN ADULT: Primary | ICD-10-CM

## 2025-02-13 DIAGNOSIS — E66.09 CLASS 1 OBESITY DUE TO EXCESS CALORIES WITHOUT SERIOUS COMORBIDITY WITH BODY MASS INDEX (BMI) OF 34.0 TO 34.9 IN ADULT: Primary | ICD-10-CM

## 2025-02-14 NOTE — TELEPHONE ENCOUNTER
Dr. José- was weight loss meds discussed at recent office visit? If not, will advise to schedule appointment to discuss.

## 2025-02-17 ENCOUNTER — TELEPHONE (OUTPATIENT)
Dept: FAMILY MEDICINE CLINIC | Facility: CLINIC | Age: 40
End: 2025-02-17

## 2025-02-17 DIAGNOSIS — G43.109 MIGRAINE WITH AURA AND WITHOUT STATUS MIGRAINOSUS, NOT INTRACTABLE: ICD-10-CM

## 2025-02-17 NOTE — TELEPHONE ENCOUNTER
Caryn Adele Behnke requesting Medication Refill for: Rizatriptan Benzoate 10 MG Oral Tab       Preferred Pharmacy: [id number] [place], [place]     LOV: 2/4/2025   Last Refill date: 12/16/24  Next Scheduled appointment:

## 2025-02-18 RX ORDER — RIZATRIPTAN BENZOATE 10 MG/1
TABLET ORAL
Qty: 10 TABLET | Refills: 3 | Status: SHIPPED | OUTPATIENT
Start: 2025-02-18

## 2025-02-19 ENCOUNTER — OFFICE VISIT (OUTPATIENT)
Facility: LOCATION | Age: 40
End: 2025-02-19
Payer: COMMERCIAL

## 2025-02-19 DIAGNOSIS — M76.71 PERONEAL TENDINITIS, RIGHT: Primary | ICD-10-CM

## 2025-02-19 DIAGNOSIS — M25.471 EDEMA OF RIGHT ANKLE: ICD-10-CM

## 2025-02-19 DIAGNOSIS — M76.61 ACHILLES TENDINITIS OF RIGHT LOWER EXTREMITY: ICD-10-CM

## 2025-02-19 DIAGNOSIS — M25.571 ACUTE RIGHT ANKLE PAIN: ICD-10-CM

## 2025-02-19 DIAGNOSIS — G57.91 NEURITIS OF RIGHT ANKLE: ICD-10-CM

## 2025-02-19 DIAGNOSIS — M72.2 PLANTAR FASCIITIS OF RIGHT FOOT: ICD-10-CM

## 2025-02-19 DIAGNOSIS — M79.671 RIGHT FOOT PAIN: ICD-10-CM

## 2025-02-19 DIAGNOSIS — M62.461 GASTROCNEMIUS EQUINUS OF RIGHT LOWER EXTREMITY: ICD-10-CM

## 2025-02-19 PROCEDURE — 99214 OFFICE O/P EST MOD 30 MIN: CPT | Performed by: PODIATRIST

## 2025-02-19 NOTE — PROGRESS NOTES
[facility]  Progress Note      Caryn Adele Behnke is a 39 year old female.   Chief Complaint   Patient presents with    Foot Pain     Right- Patient has done 1 session of PT since last visit. Rates pain at 3/10.         HPI:     Patient is a 39-year-old female who is returning to clinic today for recheck on right lower extremity.  Patient states that she has done 1 physical therapy sessions since her last visit and states that the physical therapist schedule does not work out with her nurse.  She does state that she has been doing stretching at home.  Continues to have pain, mostly to the outside and back of the ankle.  States that her plantar fasciitis has been fairly under control.  Rates her pain 3/10.  Denies any recent injuries.  She does have some continued tingling over the top of her right foot.  She is ambulating in Oofos sandals today.  Has been utilizing ankle compression sleeve.  Patient is inquiring about an MRI.  Patient states that her overall pain has been ongoing since October of last year.  No other concerns at this time.      Allergies: Ibuprofen and Lactose   Current Outpatient Medications   Medication Sig Dispense Refill    Rizatriptan Benzoate 10 MG Oral Tab take one tablet by mouth one time daily as needed for migraine. may repeat in 2 hours if needed. Max of 2 tablets in 24 hours 10 tablet 3    semaglutide-weight management 0.25 MG/0.5ML Subcutaneous Solution Auto-injector Inject 0.5 mL (0.25 mg total) into the skin once a week for 4 doses. 2 mL 0    Levonorgest-Eth Estrad 91-Day 0.15-0.03 MG Oral Tab Take 1 tablet by mouth daily. 91 tablet 3    Amphetamine-Dextroamphet ER (ADDERALL XR) 20 MG Oral Capsule SR 24 Hr Take 1 capsule (20 mg total) by mouth daily. 30 capsule 0    [START ON 3/13/2025] Amphetamine-Dextroamphet ER (ADDERALL XR) 20 MG Oral Capsule SR 24 Hr Take 1 capsule (20 mg total) by mouth daily. 30 capsule 0    amphetamine-dextroamphetamine 20 MG Oral Tab Take 1  tablet (20 mg total) by mouth daily.      cyclobenzaprine 5 MG Oral Tab Take 1-2 tablets (5-10 mg total) by mouth 3 (three) times daily as needed for Muscle spasms. Do not take while driving. Do not drink alcohol with use 60 tablet 0      Past Medical History:    Lipid screening    Unspecified vitamin D deficiency      Past Surgical History:   Procedure Laterality Date    Appendectomy  1996    Appendectomy        Family History   Problem Relation Age of Onset    Other (Bipolar disorder) Father     Other (Depression) Father     Diabetes Mother     Other (Depression) Mother     Other (Hypothyroidism) Mother     Breast Cancer Maternal Grandmother     Breast Cancer Paternal Grandmother 50    Cancer Paternal Grandmother     Heart Disease Paternal Grandmother     Other (Other) Paternal Grandmother     Other (HTN) Paternal Grandmother     Other (Depression) Brother     Schizophrenia Brother     Other (proteus syndrome) Brother       Social History     Socioeconomic History    Marital status:    Tobacco Use    Smoking status: Former     Current packs/day: 0.00     Average packs/day: 1 pack/day for 18.0 years (18.0 ttl pk-yrs)     Types: Cigarettes     Start date:      Quit date: 2013     Years since quittin.9    Smokeless tobacco: Never    Tobacco comments:     over 1 pk. daily   Vaping Use    Vaping status: Never Used   Substance and Sexual Activity    Alcohol use: Yes     Alcohol/week: 1.0 standard drink of alcohol     Types: 1 Glasses of wine per week     Comment: \"rare\"    Drug use: No    Sexual activity: Not Currently     Partners: Male     Birth control/protection: Abstinence   Other Topics Concern    Caffeine Concern Yes     Comment: soda    Exercise Yes     Comment: \"occasionally\"    Seat Belt Yes           REVIEW OF SYSTEMS:     10 point ROS completed and was negative unless stated in HPI.      EXAM:     Right lower extremity focused exam:  GENERAL: well developed, well nourished, in no  apparent distress  EXTREMITIES:  1. Integument: Skin appears moist, warm, and supple with positive hair growth. There are no color changes. No open lesions. No macerations. No Hyperkeratotic lesions.   2. Vascular: Dorsalis pedis 2/4 bilateral and posterior tibial pulses 2/4 bilateral, capillary refill normal.  3. Neurological: Gross sensation intact via light touch bilaterally.  There is a positive Tinel sign noted just posterior to the distal fibula, which causes tingling to the dorsal lateral foot.  4. Musculoskeletal: All muscle groups are graded 5/5 in the foot and ankle with no pain elicited in any direction against resistance today.  Patient has no pain with palpation to the Achilles tendon today.  There is no palpable dell, no increased thickness of the Achilles tendon, negative Dumont test, and no current signs of Achilles tendon rupture.  There is pain with palpation along the course of the peroneal tendon posterior to the lateral malleolus distally to just distal to the lateral malleolus.  No current signs of peroneal tendon rupture as patient has adequate eversion strength.  Patient does have mild continued pain with palpation to the plantar medial aspect of the right heel near the origin of plantar fascia. No pain with palpation to posterior tibial tendon.  Some discomfort elicited with passive ROM of ankle and STJ.  Improved pain with palpation within the medial and anterior ankle gutters.  Mild discomfort with direct palpation to lateral STJ.  Patient does have decreased ankle joint dorsiflexion with the knee extended, which slightly improves with the knee flexed consistent with an equinus deformity.       ASSESSMENT AND PLAN:   Diagnoses and all orders for this visit:    Peroneal tendinitis, right  -     MRI ANKLE, RIGHT (CPT=73721); Future    Achilles tendinitis of right lower extremity  -     MRI ANKLE, RIGHT (CPT=73721); Future    Plantar fasciitis of right foot  -     MRI ANKLE, RIGHT  (CPT=73721); Future    Gastrocnemius equinus of right lower extremity  -     MRI ANKLE, RIGHT (CPT=73721); Future    Edema of right ankle  -     MRI ANKLE, RIGHT (CPT=73721); Future    Acute right ankle pain  -     MRI ANKLE, RIGHT (CPT=73721); Future    Right foot pain    Neuritis of right ankle        Plan:   -Patient was seen and evaluated today in clinic.  Chart history reviewed.    Discussed clinical exam findings with patient.  She is continuing to have pain more so to the posterior lateral aspect of the ankle along the course of peroneal tendons.  It appears that the Achilles tendinitis has improved overall.  She does continue to have some discomfort near the location of the origin of the plantar fascia consistent with plantar fasciitis as well.  Patient also has some neuritis symptoms to the posterior lateral ankle, which may be due to current inflammation.  There is some edema noted in this location.    Patient has tried multiple conservative options and is continuing with physical therapy.  She has only been to 1 therapy session since her last visit due to schedule conflicts.  She does state she has been doing stretching exercises at home and continues to have pain.    I did encourage patient to continue with physical therapy and consider going to a new location/new physical therapist who would be able to work better with her busy schedule.  Patient did verbalize she feels that physical therapy will not be beneficial due to the chronicity of her pain.    Will order an MRI of the right ankle due to ongoing pain following conservative treatment.  Will discuss further treatment recommendations pending MRI findings.    Patient should continue to ambulate as tolerated in supportive shoe gear.  Also recommend continued use of compression ankle sleeve for edema control.    -All of the patient's questions and concerns were addressed.  They indicated their understanding of these issues and agrees to the  plan.    Time spent reviewing pertinent information from patient's chart, reviewing any pertinent imaging, obtaining history and physical exam, discussing and mutually agreeing on a treatment plan, and documenting encounter: 30 minutes    RTC after MRI    Jacinto Biggs DPM, AACFAS        2/19/2025    [facility]  [address] [address] [address]   [email or web address]            Cartour speech recognition software was used to prepare this note.  Errors in word recognition may occur.  Please contact me with any questions/concerns with this note.

## 2025-02-25 NOTE — TELEPHONE ENCOUNTER
Denied    Note from payer: Request Reference Number: [id number].  WEGOVY       INJ 0.25MG is denied for not meeting the prior authorization requirement(s).  Details of this decision are in the notice attached below or have been faxed to you.  Payer: Stella RODRIGUEZ [facility] Case ID: [id number]    [phone]    [phone]  Electronic appeal: Not supported  Appeal instructions: Appeals are not supported through ePA. Please refer to the fax case notice for appeals information and instructions.        Awaiting denial letter via fax.

## 2025-02-28 ENCOUNTER — PATIENT MESSAGE (OUTPATIENT)
Facility: LOCATION | Age: 40
End: 2025-02-28

## 2025-03-03 ENCOUNTER — PATIENT MESSAGE (OUTPATIENT)
Dept: FAMILY MEDICINE CLINIC | Facility: CLINIC | Age: 40
End: 2025-03-03

## 2025-03-03 DIAGNOSIS — Z97.5 IUD CONTRACEPTION: Primary | ICD-10-CM

## 2025-03-03 NOTE — TELEPHONE ENCOUNTER
I am imagining you would still be ok with this note. What time frame would you want to provide or would that be up to the patient?

## 2025-03-03 NOTE — TELEPHONE ENCOUNTER
Unsure how long this timeframe would be.  Okay to ask patient.  If MRI is leading to surgical treatment, may be 2-3 months.  But will have to wait for MRI results.  Thank you

## 2025-03-03 NOTE — TELEPHONE ENCOUNTER
Please advise on note, I am thinking she would like the note to be ongoing, at lease until after the next visit post MRI

## 2025-03-03 NOTE — TELEPHONE ENCOUNTER
I am okay with providing work note for patient until her next visit to discuss MRI results.  We can give her an updated note pending treatment plan at her next visit.  Thank you

## 2025-03-06 DIAGNOSIS — Z30.011 ENCOUNTER FOR INITIAL PRESCRIPTION OF CONTRACEPTIVE PILLS: ICD-10-CM

## 2025-03-06 NOTE — TELEPHONE ENCOUNTER
Stop ocp   Refer to gyn for mirena or nexplanon     As before - recommend patient try medication such as effexor to help with mood issues and ADD.   Follow up in clinic to discuss if mood issues persist.       She should be excused from gym until she has completed PT and podiatry releases her to normal activity

## 2025-03-08 RX ORDER — NORETHINDRONE ACETATE/ETHINYL ESTRADIOL AND FERROUS FUMARATE 1.5-30(21)
1 KIT ORAL DAILY
Qty: 84 TABLET | Refills: 0 | Status: SHIPPED | OUTPATIENT
Start: 2025-03-08

## 2025-03-12 NOTE — TELEPHONE ENCOUNTER
[employer] message sent to patient to let her know WEGOVY was denied and provided recommendations from Dr. José.

## 2025-03-12 NOTE — TELEPHONE ENCOUNTER
Patient will need to continue diet/exercise modification. She can also look into retail weight loss medical clinics if desired.

## 2025-03-20 ENCOUNTER — HOSPITAL ENCOUNTER (OUTPATIENT)
Dept: MRI IMAGING | Age: 40
Discharge: HOME | End: 2025-03-20
Attending: PODIATRIST
Payer: COMMERCIAL

## 2025-03-20 DIAGNOSIS — M76.71 PERONEAL TENDINITIS, RIGHT: ICD-10-CM

## 2025-03-20 DIAGNOSIS — M62.461 GASTROCNEMIUS EQUINUS OF RIGHT LOWER EXTREMITY: ICD-10-CM

## 2025-03-20 DIAGNOSIS — M76.61 ACHILLES TENDINITIS OF RIGHT LOWER EXTREMITY: ICD-10-CM

## 2025-03-20 DIAGNOSIS — M25.471 EDEMA OF RIGHT ANKLE: ICD-10-CM

## 2025-03-20 DIAGNOSIS — M25.571 ACUTE RIGHT ANKLE PAIN: ICD-10-CM

## 2025-03-20 DIAGNOSIS — M72.2 PLANTAR FASCIITIS OF RIGHT FOOT: ICD-10-CM

## 2025-03-20 PROCEDURE — 73721 MRI JNT OF LWR EXTRE W/O DYE: CPT | Performed by: PODIATRIST

## 2025-03-26 ENCOUNTER — OFFICE VISIT (OUTPATIENT)
Dept: FAMILY MEDICINE CLINIC | Facility: CLINIC | Age: 40
End: 2025-03-26
Payer: COMMERCIAL

## 2025-03-26 VITALS
SYSTOLIC BLOOD PRESSURE: 118 MMHG | DIASTOLIC BLOOD PRESSURE: 78 MMHG | OXYGEN SATURATION: 99 % | BODY MASS INDEX: 34.73 KG/M2 | RESPIRATION RATE: 18 BRPM | HEIGHT: 63 IN | HEART RATE: 92 BPM | WEIGHT: 196 LBS

## 2025-03-26 DIAGNOSIS — S49.91XA RIGHT SHOULDER INJURY, INITIAL ENCOUNTER: Primary | ICD-10-CM

## 2025-03-26 PROCEDURE — 99213 OFFICE O/P EST LOW 20 MIN: CPT

## 2025-03-26 RX ORDER — CYCLOBENZAPRINE HCL 5 MG
TABLET ORAL
Qty: 20 TABLET | Refills: 0 | Status: SHIPPED | OUTPATIENT
Start: 2025-03-26

## 2025-03-26 NOTE — PROGRESS NOTES
[facility] Office Note  Chief Complaint:   Chief Complaint   Patient presents with    Shoulder Pain     C/o right shoulder pain. Symptoms started 6 weeks ago. Fell down stairs while on vacation. Last 2-3 weeks pain is constant/loss of ROM        HPI:   Caryn Adele Behnke is a 39 year old female who presents for a right shoulder pain. Symptoms began 6 weeks ago when she was on vacation in the [facility] and slipped and felled down stairs. States she tried to catch herself with her right arm/shoulder. She feels like the pain slowly went away the first few days after the injury but the pain has now came back in more severity within the last 2-3 weeks. Pain is now constant and she is having limited rom of right shoulder.  Pain is pain with pulling items with right arm and moving arm laterally. Denies increase in pain moving right arm upwards and downwards. Also has pain sleeping on the right shoulder.  Has not tried any otc meds for relief, has allergy to ibuprofen.    Any numbness or tingling at site of injury or down arm/fingers?: no  Any feeling of the shoulder slipping out of its joint socket?: no  Any feelings of grinding/clicking near shoulder joint?: yes   Right handed or Left handed? right -handed    Current Outpatient Medications   Medication Sig Dispense Refill    cyclobenzaprine 5 MG Oral Tab Take 1 tablet (5mg total) by mouth nightly as needed for shoulder pain. Do not take while driving. Do not drink alcohol with use 20 tablet 0    JIM FE 1.5/30 1.5-30 MG-MCG Oral Tab TAKE 1 TABLET BY MOUTH ONCE DAILY 84 tablet 0    Rizatriptan Benzoate 10 MG Oral Tab take one tablet by mouth one time daily as needed for migraine. may repeat in 2 hours if needed. Max of 2 tablets in 24 hours 10 tablet 3    Amphetamine-Dextroamphet ER (ADDERALL XR) 20 MG Oral Capsule SR 24 Hr Take 1 capsule (20 mg total) by mouth daily. 30 capsule 0    amphetamine-dextroamphetamine 20 MG Oral Tab Take 1 tablet  (20 mg total) by mouth daily.        Past Medical History:    Lipid screening    Unspecified vitamin D deficiency      Past Surgical History:   Procedure Laterality Date    Appendectomy  1996    Appendectomy        Family History   Problem Relation Age of Onset    Other (Bipolar disorder) Father     Other (Depression) Father     Diabetes Mother     Other (Depression) Mother     Other (Hypothyroidism) Mother     Breast Cancer Maternal Grandmother     Breast Cancer Paternal Grandmother 50    Cancer Paternal Grandmother     Heart Disease Paternal Grandmother     Other (Other) Paternal Grandmother     Other (HTN) Paternal Grandmother     Other (Depression) Brother     Schizophrenia Brother     Other (proteus syndrome) Brother       Social History:  Social History     Socioeconomic History    Marital status:    Tobacco Use    Smoking status: Former     Current packs/day: 0.00     Average packs/day: 1 pack/day for 18.0 years (18.0 ttl pk-yrs)     Types: Cigarettes     Start date:      Quit date: 2013     Years since quittin.0    Smokeless tobacco: Never    Tobacco comments:     over 1 pk. daily   Vaping Use    Vaping status: Never Used   Substance and Sexual Activity    Alcohol use: Yes     Alcohol/week: 1.0 standard drink of alcohol     Types: 1 Glasses of wine per week     Comment: \"rare\"    Drug use: No    Sexual activity: Not Currently     Partners: Male     Birth control/protection: Abstinence   Other Topics Concern    Caffeine Concern Yes     Comment: soda    Exercise Yes     Comment: \"occasionally\"    Seat Belt Yes     Social Drivers of Health     Food Insecurity: Food Insecurity Present (3/26/2025)    NCSS - Food Insecurity     Worried About Running Out of Food in the Last Year: Yes     Ran Out of Food in the Last Year: No   Transportation Needs: No Transportation Needs (3/26/2025)    NCSS - Transportation     Lack of Transportation: No   Housing Stability: Not At Risk (3/26/2025)    NCSS  - Housing/Utilities     Has Housing: Yes     Worried About Losing Housing: No     Unable to Get Utilities: No           REVIEW OF SYSTEMS:   GENERAL: feels well otherwise  SKIN: denies any unusual skin lesions or wounds  LUNGS: denies shortness of breath with exertion  CARDIOVASCULAR: denies chest pain on exertion  MUSCULOSKELETAL: denies back pain, shoulder pain as above.     EXAM:   /78   Pulse 92   Resp 18   Ht 5' 3\" (1.6 m)   Wt 196 lb (88.9 kg)   LMP 02/22/2025 (Approximate)   SpO2 99%   BMI 34.72 kg/m²   Body mass index is 34.72 kg/m².   GENERAL: well developed, well nourished,in no apparent distress  SKIN: no rashes,no suspicious lesions  MUSCULOSKELETAL: back is not tender, FROM of the back  Right Shoulder:    Normal inspection with no shoulder deformity. No deformity of clavicle.  No skin breakdown. No erythema. No ecchymosis, No effusion.   No scapular winging. No atrophy.    Patient has no point tenderness along the ac joint.    Moderate tenderness on both internal and external rotation with active and passive ROM.    No scapula tenderness.      Shoulder Range of Motion  Normal Degrees of Motion of Shoulders   Flexion (170-180 degrees) Extension (45-60 degrees) External Rotation (90 degrees) Internal Rotation (70 degrees) Abduction (180 degrees) Adduction (40 degrees)   Left Shoulder 180 60   90 70   90 40   Right Shoulder 90 60 90 70 90 40       Rotator Cuff Tests:  Empty Can Test for Supraspinatus: negative  External Rotation against resistance for Infraspinatus & Teres Minor: negative  [place] Lift Off test for Subscapularis: negative    Impingement Tests  Neers:  negative    AC Joint Scarf Cross Body Test: positive    Biceps Testing:  Speed's (resisted flexion of biceps):  negative      Strength:     Biceps Triceps Wrist Flexion Wrist Extension Hand Intrinsics ( Strength)   Left 5/5 5/5 5/5 5/5 5/5   Right 5/5 5/5 5/5 5/5 5/5        EXTREMITIES: no cyanosis, clubbing or edema.   Brachial, radial and ulnar pulses intact and equal bilaterally. Cap refill <2 seconds.    NEURO: Oriented times three. Median, radial, ulnar nerves neurovascular intact and equal bilaterally.    ASSESSMENT AND PLAN:   1. Right shoulder injury, initial encounter  Ordered shoulder xr to evaluate if any fracture occurred as well as spacing of AC joint. Based on the results of the XR exam, may refer to ortho for deeper workup since it was an injury. Prescribed muscle relaxer due to Caryn Adele Behnke having allergy to ibuprofen and declined a medrol dose pack due to not tolerating steroids when prescribed previously.  - XR SHOULDER, COMPLETE (MIN 2 VIEWS), RIGHT (CPT=73030); Future  - cyclobenzaprine 5 MG Oral Tab; Take 1 tablet (5mg total) by mouth nightly as needed for shoulder pain. Do not take while driving. Do not drink alcohol with use  Dispense: 20 tablet; Refill: 0        Meds, Orders, & Refills for this Visit:  Requested Prescriptions     Signed Prescriptions Disp Refills    cyclobenzaprine 5 MG Oral Tab 20 tablet 0     Sig: Take 1 tablet (5mg total) by mouth nightly as needed for shoulder pain. Do not take while driving. Do not drink alcohol with use         Imaging & Consults:  XR SHOULDER, COMPLETE (MIN 2 VIEWS), RIGHT (CPT=73030)        Patient/Caregiver Education: Patient/Caregiver Education: There are no barriers to learning. Medical education done.   Outcome: Dona Turcios Behnke verbalizes understanding, agrees with the plan, and had no other questions at the end of today's visit. Caryn Adele Behnke is informed to call with any questions, complications, allergies, or worsening or changing symptoms.  Caryn Adele Behnke is to call with any side effects or complications from the treatments as a result of today.       Health Maintenance:  Health Maintenance Due   Topic Date Due    COVID-19 Vaccine (3 - 2024-25 season) 09/01/2024           Return if symptoms worsen or fail to improve.     Problem  List:  Patient Active Problem List   Diagnosis    Fibrocystic changes of right breast       Note to patient: The 21st Century Cures Act makes medical notes like these available to patients in the interest of transparency. However, this is a medical document intended as peer to peer communication. It is written in medical language and may contain abbreviations or verbiage that are unfamiliar. It may appear blunt or direct. Medical documents are intended to carry relevant information, facts as evident, and the clinical opinion of the practitioner.

## 2025-03-31 ENCOUNTER — HOSPITAL ENCOUNTER (OUTPATIENT)
Dept: GENERAL RADIOLOGY | Age: 40
Discharge: HOME | End: 2025-03-31
Payer: COMMERCIAL

## 2025-03-31 DIAGNOSIS — S49.91XA RIGHT SHOULDER INJURY, INITIAL ENCOUNTER: Primary | ICD-10-CM

## 2025-03-31 DIAGNOSIS — S49.91XA RIGHT SHOULDER INJURY, INITIAL ENCOUNTER: ICD-10-CM

## 2025-03-31 PROCEDURE — 73030 X-RAY EXAM OF SHOULDER: CPT

## 2025-04-02 ENCOUNTER — TELEPHONE (OUTPATIENT)
Facility: CLINIC | Age: 40
End: 2025-04-02

## 2025-04-02 NOTE — TELEPHONE ENCOUNTER
Patient scheduled for right shoulder injury.    Future Appointments   Date Time Provider Department Center   4/22/2025 11:00 AM Jose Main DO [facility] [address]     Please advise if new imaging is needed.

## 2025-04-17 ENCOUNTER — PATIENT MESSAGE (OUTPATIENT)
Dept: FAMILY MEDICINE CLINIC | Facility: CLINIC | Age: 40
End: 2025-04-17

## 2025-04-17 DIAGNOSIS — F98.8 ATTENTION DEFICIT DISORDER (ADD) WITHOUT HYPERACTIVITY: ICD-10-CM

## 2025-04-17 RX ORDER — DEXTROAMPHETAMINE SACCHARATE, AMPHETAMINE ASPARTATE MONOHYDRATE, DEXTROAMPHETAMINE SULFATE AND AMPHETAMINE SULFATE 5; 5; 5; 5 MG/1; MG/1; MG/1; MG/1
20 CAPSULE, EXTENDED RELEASE ORAL DAILY
Qty: 30 CAPSULE | Refills: 0 | Status: SHIPPED | OUTPATIENT
Start: 2025-04-17 | End: 2025-05-17

## 2025-04-17 NOTE — TELEPHONE ENCOUNTER
LOV/annual 25    Last refill  Amphetamine-Dextroamphet ER (ADDERALL XR) 20 MG Oral Capsule SR 24 Hr () 30 capsule 0 3/13/2025 2025   Sig:   Take 1 capsule (20 mg total) by mouth daily.       No future appt.    Rx pended for your approval if ok.  Please review and advise. Thank you.

## 2025-04-25 ENCOUNTER — OFFICE VISIT (OUTPATIENT)
Facility: CLINIC | Age: 40
End: 2025-04-25
Payer: COMMERCIAL

## 2025-04-25 VITALS — HEIGHT: 63 IN | WEIGHT: 196 LBS | BODY MASS INDEX: 34.73 KG/M2

## 2025-04-25 DIAGNOSIS — M24.811 INTERNAL DERANGEMENT OF RIGHT SHOULDER: Primary | ICD-10-CM

## 2025-04-25 DIAGNOSIS — S46.111A STRAIN OF LONG HEAD OF BICEPS, RIGHT, INITIAL ENCOUNTER: ICD-10-CM

## 2025-04-25 PROCEDURE — 99204 OFFICE O/P NEW MOD 45 MIN: CPT | Performed by: FAMILY MEDICINE

## 2025-04-28 NOTE — H&P
Sports Medicine Clinic Note    Subjective:    Chief Complaint: Right shoulder pain    Date of Injury: 2/11/25    History: 39-year-old RHD female presents with right shoulder pain following a fall down stairs while visiting the [place]. Pain has persisted since February, described as a dull ache worsened by overhead activities and lifting. She reports exacerbation with tasks like washing her hair or lifting objects at work as a . She has avoided seeking prior medical care, citing a prior adverse reaction to steroids. No prior physical therapy attempted. She recalls a sensation of subluxation at the time of injury but no estela dislocations since.    Objective:    Right Shoulder Examination:    Inspection: No swelling, erythema, or deformity noted.  Palpation: Tenderness localized to the biceps tendon region.  Range of Motion: Full range of motion with pain during resisted forward flexion and activities requiring overhead reach.  Neurovascular: No evidence of neuromuscular compromise.  Special Tests: Positive Speed's test suggesting biceps tendon pathology. No drop arm or lag signs. Anterior apprehension is present.    Diagnostic Tests:    Radiographs of the right shoulder personally reviewed. Negative for glenohumeral fracture or malalignment. Normal glenohumeral and acromioclavicular joints with preservation of the subacromial interval. No abnormality seen in the visualized bony thorax or regional soft tissues.    Assessment:    Suspected right biceps tendon strain  Consider SLAP tear based on mechanism and symptomatology    Plan:    Additional Workup: Order MRI of the right shoulder without contrast to further evaluate for a SLAP tear or other soft tissue injury.  Therapy: Physical therapy to be considered pending MRI findings for shoulder strengthening and stabilization.  Medications: NSAIDs and acetaminophen as needed for pain management.  Bracing/Casting: None indicated at this  time.  Procedures: None at this time.  Activity Recommendations: Limit overhead lifting and heavy physical activities at work. Modify daily activities as tolerated.    Follow-Up: Tentatively scheduled once MRI results are completed to reassess and discuss further management.       Jose Main DO, LIZ   [facility]

## 2025-05-17 DIAGNOSIS — Z30.011 ENCOUNTER FOR INITIAL PRESCRIPTION OF CONTRACEPTIVE PILLS: ICD-10-CM

## 2025-05-20 NOTE — TELEPHONE ENCOUNTER
Lvm for patient to schedule exam with us if she wants us to refill her BC medication. She does have a 7/22/25 appt with Dr. Jimenez, I asked her to call their office regarding her med refill if she was transferring to their practice.

## 2025-05-22 ENCOUNTER — HOSPITAL ENCOUNTER (OUTPATIENT)
Dept: MRI IMAGING | Age: 40
Discharge: HOME | End: 2025-05-22
Attending: FAMILY MEDICINE
Payer: COMMERCIAL

## 2025-05-22 DIAGNOSIS — M24.811 INTERNAL DERANGEMENT OF RIGHT SHOULDER: ICD-10-CM

## 2025-05-22 PROCEDURE — 73221 MRI JOINT UPR EXTREM W/O DYE: CPT | Performed by: FAMILY MEDICINE

## 2025-05-22 RX ORDER — NORETHINDRONE ACETATE/ETHINYL ESTRADIOL AND FERROUS FUMARATE 1.5-30(21)
1 KIT ORAL DAILY
Qty: 84 TABLET | Refills: 0 | OUTPATIENT
Start: 2025-05-22

## 2025-06-03 NOTE — PROGRESS NOTES
Annual Exam (Ages 22-39)    Chief Complaint   Patient presents with    Annual     Patient is here for an annual exam and to discuss BC options.          Subjective:    This is a 39 year old  presenting for routine annual exam    Menarche: 15 (6/3/2025  4:27 PM)  Period Cycle (Days): 28-30 (6/3/2025  4:27 PM)  Period Duration (Days): varies (6/3/2025  4:27 PM)  Period Flow: moderate (6/3/2025  4:27 PM)  Use of Birth Control (if yes, specify type): OCP (6/3/2025  4:27 PM)  Hx Prior Abnormal Pap: No (6/3/2025  4:27 PM)  Pap Date: 24 (6/3/2025  4:27 PM)  Pap Result Notes: wnl (6/3/2025  4:27 PM)    Last year she started Loestrin 1. due to mood changes. Her symptoms have improved, but she is still having them. She reports that week 2 is the worst. She experiences feeling down and angry, just not herself. Week 3 she has cravings. Symptoms disappear with period.     She tried seasonique, but she had worsening symptoms.     She has a hx of migraines. Will have blind spot in her vision.      Hx Prior Abnormal Pap: No  Pap Date: 24  Pap Result Notes: wnl       Review of Systems   Constitutional: Negative.    HENT: Negative.    Respiratory: Negative.    Gastrointestinal: Negative.    Endocrine: Negative.    Genitourinary: Negative.    Musculoskeletal: Negative.    Skin: Negative.    Allergic/Immunologic: Negative.    Neurological: Negative.      Objective:    Allergies[1]  Past Medical History[2]  Medications - Current[3]  Past Surgical History[4]  Family History[5]   reports that she quit smoking about 12 years ago. Her smoking use included cigarettes. She started smoking about 28 years ago. She has a 18 pack-year smoking history. She has never used smokeless tobacco. She reports current alcohol use of about 1.0 standard drink of alcohol per week. She reports that she does not use drugs.    Physical Exam     Vitals:    25 1628   BP: 112/72   Pulse: 120        Constitutional: She is oriented to  person, place, and time. She appears well-developed and well-nourished.   Cardiovascular: Normal peripheral perfusion  Breasts: Examined sitting and supine. No cervical, supraclavicular or axillary adenopathy, no masses, skin changes or nipple discharge. Area of breast density in outter quadrants of the right breast, stable per pt's report- had mammogram and bx years ago  Pulmonary/Chest: Non labored breathing.  Abdominal: Soft. There is no tenderness.   Genitourinary: Normal appearing external genitalia. Vagina is well estrogenized. Normal appearing urethral meatus. Bartholin's gland normal to palpation. Normal appearing  cervix. Cervix is not friable and with normal appearing discharge. Uterus is 6 weeks size  and non tender. No cervical motion tenderness. Normal adnexa bilaterally without tenderness.  Neurological: She is alert and oriented to person, place, and time.     Assessment and Plan    This is a 39 year old  presenting for annual exam. Discussed her symptoms are not typical of PMDD. PMDD usually occurs with ovulation and increasing progesterone between day 14 and 28 (ie week 3 and 4). We discussed switching birth control. I do recommend inhibiting ovulation. Her hx of scotoma worries me. I do not feel comfortable prescribing estrogen. Slynd prescribed.    #Annual Exam   -Depression screening   Depression Screening (PHQ-2/PHQ-9): Over the LAST 2 WEEKS   Little interest or pleasure in doing things (over the last two weeks)?: Not at all    Feeling down, depressed, or hopeless (over the last two weeks)?: Not at all    PHQ-2 SCORE: 0      -Blood pressure screening normal  -Contraception OCP  -Body mass index is 33.73 kg/m².   -STD screening declined  -Pap smear UTD  -normal breast exam    Willy Jimenez MD           [1]   Allergies  Allergen Reactions    Ibuprofen HIVES     \"CAPS\"    Lactose OTHER (SEE COMMENTS)     GI   [2]   Past Medical History:   Lipid screening    Unspecified vitamin D deficiency    [3]   Current Outpatient Medications:     Drospirenone (SLYND) 4 MG Oral Tab, Take 1 tablet by mouth daily., Disp: 84 tablet, Rfl: 3    amphetamine-dextroamphetamine 20 MG Oral Tab, Take 1 tablet (20 mg total) by mouth daily., Disp: 30 tablet, Rfl: 0    cyclobenzaprine 5 MG Oral Tab, Take 1 tablet (5mg total) by mouth nightly as needed for shoulder pain. Do not take while driving. Do not drink alcohol with use, Disp: 20 tablet, Rfl: 0    Rizatriptan Benzoate 10 MG Oral Tab, take one tablet by mouth one time daily as needed for migraine. may repeat in 2 hours if needed. Max of 2 tablets in 24 hours, Disp: 10 tablet, Rfl: 3    Drospirenone (SLYND) 4 MG Oral Tab, Take 1 tablet by mouth daily., Disp: 84 tablet, Rfl: 0  [4]   Past Surgical History:  Procedure Laterality Date    Appendectomy  1/1/1996    Appendectomy     [5]   Family History  Problem Relation Age of Onset    Other (Bipolar disorder) Father     Other (Depression) Father     Diabetes Mother     Other (Depression) Mother     Other (Hypothyroidism) Mother     Breast Cancer Maternal Grandmother     Breast Cancer Paternal Grandmother 50    Cancer Paternal Grandmother     Heart Disease Paternal Grandmother     Other (Other) Paternal Grandmother     Other (HTN) Paternal Grandmother     Other (Depression) Brother     Schizophrenia Brother     Other (proteus syndrome) Brother

## 2025-06-04 NOTE — TELEPHONE ENCOUNTER
Prior authorization approved  Payer: [employer] Commerical Case ID: [id number]    [phone]    [phone]  Note from payer: Request Reference Number: [id number].  SLYND        TAB 4MG is approved through 06/03/2026.  Your patient may now fill this prescription and it will be covered.  Approval Details    Authorization number: [id number]  Authorized from June 4, 2025 to Cesilia 3, 2026    Patient notified of approval by [employer].

## 2025-06-04 NOTE — TELEPHONE ENCOUNTER
----- Message from Willy Jimenez sent at 6/3/2025  4:58 PM CDT -----  Hello, I prescribed Slynd for this patient with PMDD and ocular migraines/auras. Can we start a PA and notify the pt? Thank you!

## 2025-06-11 NOTE — PROGRESS NOTES
[facility] On[facility] Clinic Note    Visit Diagnosis:  1. Abnormal MRI        History of Present Illness: 39F referred by Dr. José to discuss her MRI    She had an MRI of the R shoulder which noted \"red marrow shift.\"  Previous labs--Ferritin 9 in 2024, B12 687 in 2023, Folate 16 in 2021. CBC is normal.     She has normal menses. No melena or hematochezia.    She takes aleve fairly frequently for leg pain. In the past month, legs feel more painful/swollen. No recent travel/surgery/hospitalization. She takes a hormone supplement.     She has palpitations but this is worse with new Adderal prescription. She is feeling fatigued. Feels occasionally short of breath. Chews ice frequently. Feels dizzy.     Mom is anemic but not sure why. No gI cancer in family.       Review of Systems: 12 Point ROS was completed and pertinent positives are in the HPI    Medications Ordered Prior to Encounter[1]  Past Medical History[2]  Past Surgical History[3]  Set as collapsible by default.[4]   Family History[5]    Physical Exam  Height: --  Weight: 86.6 kg (191 lb) (06/12 1459)  BSA (Calculated - sq m): --  Pulse: 121 (06/12 1459)  BP: 122/72 (06/12 1459)  Temp: 98.2 °F (36.8 °C) (06/12 1459)  Do Not Use - Resp Rate: --  SpO2: 98 % (06/12 1459)  General: NAD, AOX3  HEENT: clear op, mmm, no jvd, no scleral icterus  CV: RR Tachy   Extremities: No edema   Lungs:  no increased work of breathing  Abd: non distended   Neuro: CN: II-XII grossly intact    Results:  Lab Results   Component Value Date    WBC 7.7 02/04/2025    HGB 13.5 02/04/2025    HCT 39.3 02/04/2025    MCV 95.2 02/04/2025    .0 02/04/2025       No results for input(s): \"GLU\", \"BUN\", \"CREATSERUM\", \"GFRAA\", \"GFRNAA\", \"EGFRCR\", \"CA\", \"ALB\", \"NA\", \"K\", \"CL\", \"CO2\", \"ALKPHO\", \"AST\", \"ALT\", \"BILT\", \"TP\" in the last 168 hours.    Radiology: reviewed   MR shoulder  CONCLUSION:    No evidence of significant internal derangement of the right shoulder.  No  evidence of rotator cuff, biceps or labral abnormalities.  Of note, there is evidence of red marrow shift with increased hematopoietic marrow.  The visualized osseous structures.    Please correlate clinically for an underlying anemia.     Pathology: reviewed     Assessment and Plan:  She had an MRI of the R shoulder which noted \"red marrow shift.\" CBC is totally normal. Red marrow shift is usually a physiologic and normal. We can check her B12, Folate, Fe studies and SPEP. Ok to trend CBC every 6 months for 2 years. If she develops cytopenias, a bone marrow biopsy can be considered.     Iron Deficiency  -Referred to GI. Has normal menses  -IV InFED x 1. Risks and benefits discussed  -Repeat labs in 3 months     Tachycardia-likely from adderal. Follow up with PCP    Leg Pain/swelling: She is on an OCP. D-dimer ordered. If elevated, stat doppler    OSCAR Lindsay MD  [facility]         [1]   Current Outpatient Medications on File Prior to Visit   Medication Sig Dispense Refill    Drospirenone (SLYND) 4 MG Oral Tab Take 1 tablet by mouth daily. 84 tablet 3    Drospirenone (SLYND) 4 MG Oral Tab Take 1 tablet by mouth daily. 84 tablet 0    amphetamine-dextroamphetamine 20 MG Oral Tab Take 1 tablet (20 mg total) by mouth daily. 30 tablet 0    [] Amphetamine-Dextroamphet ER (ADDERALL XR) 20 MG Oral Capsule SR 24 Hr Take 1 capsule (20 mg total) by mouth daily. 30 capsule 0    cyclobenzaprine 5 MG Oral Tab Take 1 tablet (5mg total) by mouth nightly as needed for shoulder pain. Do not take while driving. Do not drink alcohol with use 20 tablet 0    Rizatriptan Benzoate 10 MG Oral Tab take one tablet by mouth one time daily as needed for migraine. may repeat in 2 hours if needed. Max of 2 tablets in 24 hours 10 tablet 3     No current facility-administered medications on file prior to visit.   [2]   Past Medical History:   Lipid screening    Unspecified vitamin D deficiency   [3]    Past Surgical History:  Procedure Laterality Date    Appendectomy  1996    Appendectomy     [4]   Social History  Socioeconomic History    Marital status:    Tobacco Use    Smoking status: Former     Current packs/day: 0.00     Average packs/day: 1 pack/day for 18.0 years (18.0 ttl pk-yrs)     Types: Cigarettes     Start date:      Quit date: 2013     Years since quittin.2    Smokeless tobacco: Never    Tobacco comments:     over 1 pk. daily   Vaping Use    Vaping status: Never Used   Substance and Sexual Activity    Alcohol use: Yes     Alcohol/week: 1.0 standard drink of alcohol     Types: 1 Glasses of wine per week     Comment: \"rare\"    Drug use: No    Sexual activity: Yes     Partners: Male     Birth control/protection: OCP   [5]   Family History  Problem Relation Age of Onset    Other (Bipolar disorder) Father     Other (Depression) Father     Diabetes Mother     Other (Depression) Mother     Other (Hypothyroidism) Mother     Breast Cancer Maternal Grandmother     Breast Cancer Paternal Grandmother 50    Cancer Paternal Grandmother     Heart Disease Paternal Grandmother     Other (Other) Paternal Grandmother     Other (HTN) Paternal Grandmother     Other (Depression) Brother     Schizophrenia Brother     Other (proteus syndrome) Brother

## 2025-06-12 ENCOUNTER — OFFICE VISIT (OUTPATIENT)
Age: 40
End: 2025-06-12
Attending: INTERNAL MEDICINE
Payer: COMMERCIAL

## 2025-06-12 VITALS
BODY MASS INDEX: 34 KG/M2 | TEMPERATURE: 98 F | SYSTOLIC BLOOD PRESSURE: 122 MMHG | OXYGEN SATURATION: 98 % | HEART RATE: 121 BPM | RESPIRATION RATE: 18 BRPM | DIASTOLIC BLOOD PRESSURE: 72 MMHG | WEIGHT: 191 LBS

## 2025-06-12 DIAGNOSIS — M79.89 LEG SWELLING: ICD-10-CM

## 2025-06-12 DIAGNOSIS — R93.89 ABNORMAL MRI: Primary | ICD-10-CM

## 2025-06-12 PROBLEM — E61.1 IRON DEFICIENCY: Status: ACTIVE | Noted: 2025-06-12

## 2025-06-12 NOTE — PROGRESS NOTES
Education Record    Learner:  Patient    Disease / Diagnosis: Abnormal MRI     Barriers / Limitations:  None   Comments:    Method:  Discussion   Comments:    General Topics:  Medication, Side effects and symptom management, and Plan of care reviewed   Comments:    Outcome:  Shows understanding   Comments:    Here for abnormal MRI. Feeling fatigued, shortness of breath with exertion, occasional dizziness. No heavy periods. Feeling her legs are swollen for about a month.

## 2025-06-13 ENCOUNTER — HOSPITAL ENCOUNTER (OUTPATIENT)
Dept: ULTRASOUND IMAGING | Facility: HOSPITAL | Age: 40
Discharge: HOME | End: 2025-06-13
Attending: INTERNAL MEDICINE
Payer: COMMERCIAL

## 2025-06-13 DIAGNOSIS — M79.89 LEG SWELLING: ICD-10-CM

## 2025-06-13 PROCEDURE — 93970 EXTREMITY STUDY: CPT | Performed by: INTERNAL MEDICINE

## 2025-06-20 ENCOUNTER — OFFICE VISIT (OUTPATIENT)
Dept: FAMILY MEDICINE CLINIC | Facility: CLINIC | Age: 40
End: 2025-06-20
Payer: COMMERCIAL

## 2025-06-20 VITALS
OXYGEN SATURATION: 98 % | SYSTOLIC BLOOD PRESSURE: 120 MMHG | HEIGHT: 63 IN | DIASTOLIC BLOOD PRESSURE: 70 MMHG | WEIGHT: 190 LBS | BODY MASS INDEX: 33.66 KG/M2 | HEART RATE: 78 BPM

## 2025-06-20 DIAGNOSIS — R60.0 BILATERAL LEG EDEMA: Primary | ICD-10-CM

## 2025-06-20 DIAGNOSIS — F98.8 ATTENTION DEFICIT DISORDER (ADD) WITHOUT HYPERACTIVITY: ICD-10-CM

## 2025-06-20 DIAGNOSIS — R63.1 POLYDIPSIA: ICD-10-CM

## 2025-06-20 DIAGNOSIS — R06.02 SOB (SHORTNESS OF BREATH) ON EXERTION: ICD-10-CM

## 2025-06-20 DIAGNOSIS — R00.0 TACHYCARDIA: ICD-10-CM

## 2025-06-20 LAB
ATRIAL RATE: 113 BPM
P AXIS: 56 DEGREES
P-R INTERVAL: 154 MS
Q-T INTERVAL: 342 MS
QRS DURATION: 92 MS
QTC CALCULATION (BEZET): 469 MS
R AXIS: 14 DEGREES
T AXIS: 27 DEGREES
VENTRICULAR RATE: 113 BPM

## 2025-06-20 PROCEDURE — 99214 OFFICE O/P EST MOD 30 MIN: CPT | Performed by: FAMILY MEDICINE

## 2025-06-20 PROCEDURE — 93000 ELECTROCARDIOGRAM COMPLETE: CPT | Performed by: FAMILY MEDICINE

## 2025-06-20 RX ORDER — SPIRONOLACTONE 25 MG/1
TABLET ORAL DAILY
Qty: 180 TABLET | Refills: 3 | Status: SHIPPED | OUTPATIENT
Start: 2025-06-20 | End: 2025-07-13

## 2025-06-23 ENCOUNTER — HOSPITAL ENCOUNTER (OUTPATIENT)
Dept: GENERAL RADIOLOGY | Age: 40
Discharge: HOME | End: 2025-06-23
Attending: FAMILY MEDICINE
Payer: COMMERCIAL

## 2025-06-23 ENCOUNTER — LAB ENCOUNTER (OUTPATIENT)
Dept: LAB | Age: 40
End: 2025-06-23
Attending: FAMILY MEDICINE
Payer: COMMERCIAL

## 2025-06-23 DIAGNOSIS — R60.0 BILATERAL LEG EDEMA: ICD-10-CM

## 2025-06-23 DIAGNOSIS — R63.1 POLYDIPSIA: ICD-10-CM

## 2025-06-23 DIAGNOSIS — R06.02 SOB (SHORTNESS OF BREATH) ON EXERTION: ICD-10-CM

## 2025-06-23 LAB
ANION GAP SERPL CALC-SCNC: 12 MMOL/L (ref 0–18)
BUN BLD-MCNC: 6 MG/DL (ref 9–23)
CALCIUM BLD-MCNC: 9.3 MG/DL (ref 8.7–10.6)
CHLORIDE SERPL-SCNC: 104 MMOL/L (ref 98–112)
CO2 SERPL-SCNC: 24 MMOL/L (ref 21–32)
CREAT BLD-MCNC: 0.48 MG/DL (ref 0.55–1.02)
EGFRCR SERPLBLD CKD-EPI 2021: 124 ML/MIN/1.73M2 (ref 60–?)
EST. AVERAGE GLUCOSE BLD GHB EST-MCNC: 103 MG/DL (ref 68–126)
FASTING STATUS PATIENT QL REPORTED: YES
GLUCOSE BLD-MCNC: 87 MG/DL (ref 70–99)
HBA1C MFR BLD: 5.2 % (ref ?–5.7)
NT-PROBNP SERPL-MCNC: 462 PG/ML (ref ?–125)
OSMOLALITY SERPL CALC.SUM OF ELEC: 287 MOSM/KG (ref 275–295)
POTASSIUM SERPL-SCNC: 4.2 MMOL/L (ref 3.5–5.1)
SODIUM SERPL-SCNC: 140 MMOL/L (ref 136–145)

## 2025-06-23 PROCEDURE — 36415 COLL VENOUS BLD VENIPUNCTURE: CPT

## 2025-06-23 PROCEDURE — 83880 ASSAY OF NATRIURETIC PEPTIDE: CPT

## 2025-06-23 PROCEDURE — 83036 HEMOGLOBIN GLYCOSYLATED A1C: CPT

## 2025-06-23 PROCEDURE — 80048 BASIC METABOLIC PNL TOTAL CA: CPT

## 2025-06-23 PROCEDURE — 71046 X-RAY EXAM CHEST 2 VIEWS: CPT | Performed by: FAMILY MEDICINE

## 2025-07-01 ENCOUNTER — LAB ENCOUNTER (OUTPATIENT)
Dept: LAB | Age: 40
End: 2025-07-01
Attending: FAMILY MEDICINE
Payer: COMMERCIAL

## 2025-07-01 DIAGNOSIS — R60.0 BILATERAL LEG EDEMA: ICD-10-CM

## 2025-07-01 LAB — NT-PROBNP SERPL-MCNC: 306 PG/ML (ref ?–125)

## 2025-07-01 PROCEDURE — 36415 COLL VENOUS BLD VENIPUNCTURE: CPT

## 2025-07-01 PROCEDURE — 83880 ASSAY OF NATRIURETIC PEPTIDE: CPT

## 2025-07-02 ENCOUNTER — PATIENT MESSAGE (OUTPATIENT)
Age: 40
End: 2025-07-02

## 2025-07-03 NOTE — TELEPHONE ENCOUNTER
please advise how long patient should waiting to monitor swelling prior to scheduling echo or follow up

## 2025-07-12 ENCOUNTER — LAB ENCOUNTER (OUTPATIENT)
Dept: LAB | Age: 40
End: 2025-07-12
Attending: FAMILY MEDICINE
Payer: COMMERCIAL

## 2025-07-12 DIAGNOSIS — R52 GENERALIZED BODY ACHES: ICD-10-CM

## 2025-07-12 DIAGNOSIS — F32.81 PMDD (PREMENSTRUAL DYSPHORIC DISORDER): ICD-10-CM

## 2025-07-12 DIAGNOSIS — R61 DIAPHORESIS: ICD-10-CM

## 2025-07-12 DIAGNOSIS — R52 PAIN AND TENDERNESS: ICD-10-CM

## 2025-07-12 DIAGNOSIS — E06.9 THYROIDITIS: ICD-10-CM

## 2025-07-12 DIAGNOSIS — R60.0 BILATERAL LEG EDEMA: ICD-10-CM

## 2025-07-12 LAB
ESTRADIOL SERPL-MCNC: 156 PG/ML
FSH SERPL-ACNC: 6.5 MIU/ML
T4 FREE SERPL-MCNC: 2.6 NG/DL (ref 0.8–1.7)
THYROGLOB SERPL-MCNC: 28 U/ML (ref ?–60)
THYROPEROXIDASE AB SERPL-ACNC: 384 U/ML (ref ?–60)
TSI SER-ACNC: <0.01 UIU/ML (ref 0.55–4.78)

## 2025-07-12 PROCEDURE — 86376 MICROSOMAL ANTIBODY EACH: CPT

## 2025-07-12 PROCEDURE — 36415 COLL VENOUS BLD VENIPUNCTURE: CPT

## 2025-07-12 PROCEDURE — 86618 LYME DISEASE ANTIBODY: CPT

## 2025-07-12 PROCEDURE — 84439 ASSAY OF FREE THYROXINE: CPT

## 2025-07-12 PROCEDURE — 83001 ASSAY OF GONADOTROPIN (FSH): CPT

## 2025-07-12 PROCEDURE — 84443 ASSAY THYROID STIM HORMONE: CPT

## 2025-07-12 PROCEDURE — 83520 IMMUNOASSAY QUANT NOS NONAB: CPT

## 2025-07-12 PROCEDURE — 82670 ASSAY OF TOTAL ESTRADIOL: CPT

## 2025-07-12 PROCEDURE — 86800 THYROGLOBULIN ANTIBODY: CPT

## 2025-07-14 ENCOUNTER — PATIENT MESSAGE (OUTPATIENT)
Dept: FAMILY MEDICINE CLINIC | Facility: CLINIC | Age: 40
End: 2025-07-14

## 2025-07-14 LAB — B BURGDOR IGG+IGM SER QL: NEGATIVE

## 2025-07-15 ENCOUNTER — HOSPITAL ENCOUNTER (OUTPATIENT)
Dept: GENERAL RADIOLOGY | Facility: HOSPITAL | Age: 40
Discharge: HOME | End: 2025-07-15
Attending: FAMILY MEDICINE
Payer: COMMERCIAL

## 2025-07-15 DIAGNOSIS — M25.561 CHRONIC PAIN OF BOTH KNEES: ICD-10-CM

## 2025-07-15 DIAGNOSIS — M25.562 CHRONIC PAIN OF BOTH KNEES: ICD-10-CM

## 2025-07-15 DIAGNOSIS — G89.29 CHRONIC PAIN OF BOTH KNEES: ICD-10-CM

## 2025-07-15 LAB — THYROTROPIN REC AB: 22.8 IU/L

## 2025-07-15 PROCEDURE — 73565 X-RAY EXAM OF KNEES: CPT | Performed by: FAMILY MEDICINE

## 2025-07-15 NOTE — TELEPHONE ENCOUNTER
Dr. José patient states medication made her drowsy  Please advise on recommendation for medication      LOV 7/12/25  Chronic musculoskeletal pain  Chronic pain in knees, ankles, and shoulders with activity-induced ankle swelling. Negative tests for rheumatoid arthritis and lupus suggest fibromyalgia. Pain affects daily activities.  - Prescribe low-dose amitriptyline at night.  - Order knee x-ray to assess for arthritis or structural issues.  - Refer to rheumatologist for evaluation of potential autoimmune or connective tissue disorder.  - Consider referral to physical therapist for lymphatic massage if cardiology evaluation is normal.  Follow-up  Plans to monitor and manage symptoms and treatment efficacy.  - Provide note for work to adjust shifts to mornings for swelling and pain management.  - Follow up with rheumatologist and cardiologist as per referrals.       Return in about 5 weeks (around 8/16/2025).

## 2025-07-28 ENCOUNTER — HOSPITAL ENCOUNTER (OUTPATIENT)
Dept: CV DIAGNOSTICS | Facility: HOSPITAL | Age: 40
Discharge: HOME | End: 2025-07-28
Attending: FAMILY MEDICINE

## 2025-07-28 DIAGNOSIS — R60.0 BILATERAL LEG EDEMA: ICD-10-CM

## 2025-07-28 PROCEDURE — 93306 TTE W/DOPPLER COMPLETE: CPT | Performed by: FAMILY MEDICINE

## (undated) NOTE — LETTER
AUTHORIZATION FOR SURGICAL OPERATION OR OTHER PROCEDURE    1. I hereby authorize Dr. Biggs, and [facility] staff assigned to my case to perform the following operation and/or procedure at the [facility] site:    _______________________________________________________________________________________________    Right heel cortisone injection.  _______________________________________________________________________________________________    2.  My physician has explained the nature and purpose of the operation or other procedure, possible alternative methods of treatment, the risks involved, and the possibility of complication to me.  I acknowledge that no guarantee has been made as to the result that may be obtained.  3.  I recognize that, during the course of this operation, or other procedure, unforseen conditions may necessitate additional or different procedure than those listed above.  I, therefore, further authorize and request that the above named physician, his/her physician assistants or designees perform such procedures as are, in his/her professional opinion, necessary and desirable.  4.  Any tissue or organs removed in the operation or other procedure may be disposed of by and at the discretion of the [facility] and [facility].  5.  I understand that in the event of a medical emergency, I will be transported by local paramedics to [facility] or other hospital emergency department.  6.  I certify that I have read and fully understand the above consent to operation and/or other procedure.    7.  I acknowledge that my physician has explained sedation/analgesia administration to me including the risks and benefits.  I consent to the administration of sedation/analgesia as may be necessary or desirable in the judgement of my physician.    Witness signature: ___________________________________________________ Date:  ______/______/_____                     Time:  ________ A.M.  P.M.       Patient Name:  ______________________________________________________  (please print)      Patient signature:  ___________________________________________________             Relationship to Patient:           []  Parent    Responsible person                          []  Spouse  In case of minor or                    [] Other  _____________   Incompetent name:  __________________________________________________                               (please print)      _____________      Responsible person  In case of minor or  Incompetent signature:  _______________________________________________    Statement of Physician  My signature below affirms that prior to the time of the procedure, I have explained to the patient and/or his/her guardian, the risks and benefits involved in the proposed treatment and any reasonable alternative to the proposed treatment.  I have also explained the risks and benefits involved in the refusal of the proposed treatment and have answered the patient's questions.                        Date:  ______/______/_______  Provider                      Signature:  __________________________________________________________       Time:  ___________ A.M    P.M.

## (undated) NOTE — LETTER
Date: 2021    Patient Name: Mychal Rodriguez   CANDEL[place]IO 1985          To Whom it may concern: The above patient was seen at the [facility] for treatment of a medical condition.     Please allow this patient light duty in the work place fo

## (undated) NOTE — LETTER
Date: 11/16/2024    Patient Name: Caryn Adele Behnke          To Whom it may concern:    This letter has been written at the patient's request. The above patient was seen at [facility] for treatment of a medical condition.    This patient should be excused from attending work from 11/16/2024 through 11/17/2024.    The patient may return to work on 11/18/2024 with no limitations as long as fever free for 24 hours without use of fever reducing agents.        Sincerely,      CHEMO Art

## (undated) NOTE — LETTER
10/8/2024          To Whom It May Concern:    Caryn Adele Behnke is currently under my medical care and may not return to work at this time.    Please excuse Dona for 10/8/24, due to a foot procedure.  She may return to work on 10/9/24.    If you require additional information please contact our office.        Sincerely,    Jacinto Biggs DPM

## (undated) NOTE — LETTER
3/3/2025          To Whom It May Concern:    Caryn Adele Behnke is currently under my medical care.    Dona is and has been unable to participate in gym activities at this time due to a foot injury. This has been ongoing since September of 2024. Please allow her to place her gym membership on hold from September of 2024 until further notice. An update with a more quantifiable time frame will be provided after additional imaging and assessment in late March or early April.     If you require additional information please contact our office.        Sincerely,    Jacinto Biggs DPM

## (undated) NOTE — Clinical Note
FYI - daughter of Rebecca ([employer] 18 P[employer])  She has always been very depressed when I see her - crying a lot. She is not satisfied with life - needs more life coaching maybe